# Patient Record
Sex: FEMALE | Race: BLACK OR AFRICAN AMERICAN | URBAN - METROPOLITAN AREA
[De-identification: names, ages, dates, MRNs, and addresses within clinical notes are randomized per-mention and may not be internally consistent; named-entity substitution may affect disease eponyms.]

---

## 2020-03-02 ENCOUNTER — NEW PATIENT (OUTPATIENT)
Dept: URBAN - METROPOLITAN AREA CLINIC 30 | Facility: CLINIC | Age: 60
End: 2020-03-02
Payer: COMMERCIAL

## 2020-03-02 PROCEDURE — 92134 CPTRZ OPH DX IMG PST SGM RTA: CPT | Performed by: OPTOMETRIST

## 2020-03-02 PROCEDURE — 92002 INTRM OPH EXAM NEW PATIENT: CPT | Performed by: OPTOMETRIST

## 2020-03-02 ASSESSMENT — KERATOMETRY
OD: 46.41
OS: 46.46

## 2020-03-02 ASSESSMENT — VISUAL ACUITY
OD: 20/20
OS: 20/25

## 2020-03-02 ASSESSMENT — INTRAOCULAR PRESSURE
OD: 16
OS: 16

## 2020-03-04 ENCOUNTER — CONSULT (OUTPATIENT)
Dept: URBAN - METROPOLITAN AREA CLINIC 30 | Facility: CLINIC | Age: 60
End: 2020-03-04
Payer: COMMERCIAL

## 2020-03-04 PROCEDURE — 76514 ECHO EXAM OF EYE THICKNESS: CPT | Performed by: OPHTHALMOLOGY

## 2020-03-04 PROCEDURE — 99204 OFFICE O/P NEW MOD 45 MIN: CPT | Performed by: OPHTHALMOLOGY

## 2020-03-04 PROCEDURE — 92133 CPTRZD OPH DX IMG PST SGM ON: CPT | Performed by: OPHTHALMOLOGY

## 2020-03-04 PROCEDURE — 92083 EXTENDED VISUAL FIELD XM: CPT | Performed by: OPHTHALMOLOGY

## 2020-03-04 PROCEDURE — 92020 GONIOSCOPY: CPT | Performed by: OPHTHALMOLOGY

## 2020-03-04 RX ORDER — PREDNISOLONE ACETATE 10 MG/ML
1 % SUSPENSION/ DROPS OPHTHALMIC
Qty: 1 | Refills: 1 | Status: INACTIVE
Start: 2020-03-04 | End: 2020-06-05

## 2020-03-04 ASSESSMENT — INTRAOCULAR PRESSURE
OS: 13
OD: 13

## 2020-04-16 ENCOUNTER — Encounter (OUTPATIENT)
Dept: URBAN - METROPOLITAN AREA CLINIC 24 | Facility: CLINIC | Age: 60
End: 2020-04-16
Payer: COMMERCIAL

## 2020-04-16 DIAGNOSIS — H40.033 ANATOMICAL NARROW ANGLE, BILATERAL: ICD-10-CM

## 2020-04-16 DIAGNOSIS — Z01.818 ENCOUNTER FOR OTHER PREPROCEDURAL EXAMINATION: Primary | ICD-10-CM

## 2020-04-16 PROCEDURE — 99213 OFFICE O/P EST LOW 20 MIN: CPT | Performed by: PHYSICIAN ASSISTANT

## 2020-04-17 ENCOUNTER — SURGERY (OUTPATIENT)
Dept: URBAN - METROPOLITAN AREA SURGERY 12 | Facility: SURGERY | Age: 60
End: 2020-04-17
Payer: COMMERCIAL

## 2020-04-17 PROCEDURE — 66761 REVISION OF IRIS: CPT | Performed by: OPHTHALMOLOGY

## 2020-05-08 ENCOUNTER — SURGERY (OUTPATIENT)
Dept: URBAN - METROPOLITAN AREA SURGERY 12 | Facility: SURGERY | Age: 60
End: 2020-05-08
Payer: COMMERCIAL

## 2020-05-08 PROCEDURE — 66761 REVISION OF IRIS: CPT | Performed by: OPHTHALMOLOGY

## 2020-06-05 ENCOUNTER — FOLLOW UP ESTABLISHED (OUTPATIENT)
Dept: URBAN - METROPOLITAN AREA CLINIC 24 | Facility: CLINIC | Age: 60
End: 2020-06-05
Payer: COMMERCIAL

## 2020-06-05 PROCEDURE — 92012 INTRM OPH EXAM EST PATIENT: CPT | Performed by: OPHTHALMOLOGY

## 2020-06-05 ASSESSMENT — INTRAOCULAR PRESSURE
OD: 16
OS: 14

## 2020-06-08 ENCOUNTER — FOLLOW UP ESTABLISHED (OUTPATIENT)
Dept: URBAN - METROPOLITAN AREA CLINIC 30 | Facility: CLINIC | Age: 60
End: 2020-06-08
Payer: COMMERCIAL

## 2020-06-08 DIAGNOSIS — H53.2 DIPLOPIA: ICD-10-CM

## 2020-06-08 DIAGNOSIS — H25.813 COMBINED FORMS OF AGE-RELATED CATARACT, BILATERAL: ICD-10-CM

## 2020-06-08 DIAGNOSIS — H35.363 DRUSEN (DEGENERATIVE) OF MACULA, BILATERAL: ICD-10-CM

## 2020-06-08 DIAGNOSIS — Z79.4 LONG TERM (CURRENT) USE OF INSULIN: ICD-10-CM

## 2020-06-08 PROCEDURE — 92134 CPTRZ OPH DX IMG PST SGM RTA: CPT | Performed by: OPTOMETRIST

## 2020-06-08 PROCEDURE — 92014 COMPRE OPH EXAM EST PT 1/>: CPT | Performed by: OPTOMETRIST

## 2020-06-08 ASSESSMENT — KERATOMETRY
OS: 46.62
OD: 46.60

## 2020-06-08 ASSESSMENT — INTRAOCULAR PRESSURE
OD: 14
OS: 14

## 2020-06-08 ASSESSMENT — VISUAL ACUITY
OS: 20/30
OD: 20/25

## 2020-06-23 ENCOUNTER — RX CHECK (OUTPATIENT)
Dept: URBAN - METROPOLITAN AREA CLINIC 30 | Facility: CLINIC | Age: 60
End: 2020-06-23
Payer: COMMERCIAL

## 2020-06-23 PROCEDURE — 99213 OFFICE O/P EST LOW 20 MIN: CPT | Performed by: OPTOMETRIST

## 2020-06-23 ASSESSMENT — VISUAL ACUITY
OD: 20/25
OS: 20/25

## 2021-09-24 ENCOUNTER — OFFICE VISIT (OUTPATIENT)
Dept: URBAN - METROPOLITAN AREA CLINIC 30 | Facility: CLINIC | Age: 61
End: 2021-09-24
Payer: COMMERCIAL

## 2021-09-24 DIAGNOSIS — H43.393 OTHER VITREOUS OPACITIES, BILATERAL: Primary | ICD-10-CM

## 2021-09-24 DIAGNOSIS — H57.11 OCULAR PAIN, RIGHT EYE: ICD-10-CM

## 2021-09-24 PROCEDURE — 92134 CPTRZ OPH DX IMG PST SGM RTA: CPT | Performed by: OPTOMETRIST

## 2021-09-24 PROCEDURE — 99214 OFFICE O/P EST MOD 30 MIN: CPT | Performed by: OPTOMETRIST

## 2021-09-24 RX ORDER — DICLOFENAC SODIUM 1 MG/ML
0.1 % SOLUTION/ DROPS OPHTHALMIC
Qty: 5 | Refills: 0 | Status: ACTIVE
Start: 2021-09-24

## 2021-09-24 ASSESSMENT — INTRAOCULAR PRESSURE
OD: 16
OS: 15

## 2021-09-24 NOTE — IMPRESSION/PLAN
Impression: Ocular pain, right eye: H57.11. Plan: Pt notes increased pain since onset of trauma x 2 wks. No evidence of iritis today. Pt notes some pain on eye movement as well. Will recommend ATs qid OU and Rx Diclofenac tid OD until next visit.

## 2021-09-24 NOTE — IMPRESSION/PLAN
Impression: Other vitreous opacities, bilateral: H43.393. Plan: Blunt trauma x 2 weeks. No evidence of iritis. Fundus exam appears stable. All signs and risks of retinal detachment or tears were discussed in detail. If pt notices any symptoms discussed or worsen, contact office ASAP. Recommend pt. return for normal recall. MAC OCT stable OU.

## 2021-10-22 ENCOUNTER — OFFICE VISIT (OUTPATIENT)
Dept: URBAN - METROPOLITAN AREA CLINIC 30 | Facility: CLINIC | Age: 61
End: 2021-10-22
Payer: COMMERCIAL

## 2021-10-22 DIAGNOSIS — E11.9 TYPE 2 DIABETES MELLITUS WITHOUT COMPLICATIONS: ICD-10-CM

## 2021-10-22 DIAGNOSIS — H43.811 VITREOUS DEGENERATION, RIGHT EYE: Primary | ICD-10-CM

## 2021-10-22 PROCEDURE — 92134 CPTRZ OPH DX IMG PST SGM RTA: CPT | Performed by: OPTOMETRIST

## 2021-10-22 PROCEDURE — 92014 COMPRE OPH EXAM EST PT 1/>: CPT | Performed by: OPTOMETRIST

## 2021-10-22 ASSESSMENT — KERATOMETRY
OD: 46.38
OS: 46.20

## 2021-10-22 ASSESSMENT — INTRAOCULAR PRESSURE
OD: 17
OS: 16

## 2021-10-22 ASSESSMENT — VISUAL ACUITY
OS: 20/20
OD: 20/20

## 2021-10-22 NOTE — IMPRESSION/PLAN
Impression: Diplopia ; OU
-EOMs full
-Pupils equal, round, reactive Plan: Denies any further episodes. New spec Rx today. ((Intermittent horizontal (noted vertical last visit 2 weeks ago) diplopia x 1-2 years, at near only. No pattern per pt in time of day. Had subdural hematoma July 2019 that was evacuated. CT 6/2020 Exophoria c L- hyper- at distance and near. Patient has hx of vertigo. 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  
NVO prism spec Rx generated for patient in office today))

## 2021-10-22 NOTE — IMPRESSION/PLAN
Impression: Vitreous degeneration, right eye: H43.811. Plan: Pt educ on findings. Retinas flat and attached OU. Reviewed signs and symptoms of RD and to call asap if occurs. MAC OCT today.

## 2021-10-22 NOTE — IMPRESSION/PLAN
Impression: Drusen (degenerative) of macula, bilateral Plan: Appears stable on exam and MAC OCT, OD>OS. Discussed as precursor to AMD- denies fam Hx. Patient educated. Rec antioxidant-rich diet and good UV protection.

## 2022-09-19 ENCOUNTER — HOSPITAL ENCOUNTER (INPATIENT)
Age: 62
LOS: 3 days | Discharge: HOME | DRG: 291 | End: 2022-09-22
Attending: EMERGENCY MEDICINE | Admitting: INTERNAL MEDICINE
Payer: MEDICARE

## 2022-09-19 ENCOUNTER — APPOINTMENT (OUTPATIENT)
Dept: GENERAL RADIOLOGY | Age: 62
DRG: 291 | End: 2022-09-19
Payer: MEDICARE

## 2022-09-19 DIAGNOSIS — I50.9 ACUTE CONGESTIVE HEART FAILURE, UNSPECIFIED HEART FAILURE TYPE (HCC): ICD-10-CM

## 2022-09-19 DIAGNOSIS — J44.1 COPD EXACERBATION (HCC): Primary | ICD-10-CM

## 2022-09-19 LAB
A/G RATIO: 1.3 (ref 1.1–2.2)
ALBUMIN SERPL-MCNC: 3.7 G/DL (ref 3.4–5)
ALP BLD-CCNC: 114 U/L (ref 40–129)
ALT SERPL-CCNC: 22 U/L (ref 10–40)
ANION GAP SERPL CALCULATED.3IONS-SCNC: 11 MMOL/L (ref 3–16)
AST SERPL-CCNC: 20 U/L (ref 15–37)
BASE EXCESS VENOUS: 5.5 MMOL/L (ref -2–3)
BASOPHILS ABSOLUTE: 0 K/UL (ref 0–0.2)
BASOPHILS RELATIVE PERCENT: 0.8 %
BILIRUB SERPL-MCNC: 0.6 MG/DL (ref 0–1)
BUN BLDV-MCNC: 10 MG/DL (ref 7–20)
CALCIUM SERPL-MCNC: 9.1 MG/DL (ref 8.3–10.6)
CARBOXYHEMOGLOBIN: 3.7 % (ref 0–1.5)
CHLORIDE BLD-SCNC: 99 MMOL/L (ref 99–110)
CO2: 30 MMOL/L (ref 21–32)
CREAT SERPL-MCNC: 0.5 MG/DL (ref 0.6–1.2)
EKG ATRIAL RATE: 92 BPM
EKG DIAGNOSIS: NORMAL
EKG P AXIS: 54 DEGREES
EKG P-R INTERVAL: 174 MS
EKG Q-T INTERVAL: 358 MS
EKG QRS DURATION: 84 MS
EKG QTC CALCULATION (BAZETT): 442 MS
EKG R AXIS: 67 DEGREES
EKG T AXIS: 83 DEGREES
EKG VENTRICULAR RATE: 92 BPM
EOSINOPHILS ABSOLUTE: 0 K/UL (ref 0–0.6)
EOSINOPHILS RELATIVE PERCENT: 0.8 %
GFR AFRICAN AMERICAN: >60
GFR NON-AFRICAN AMERICAN: >60
GLUCOSE BLD-MCNC: 161 MG/DL (ref 70–99)
HCO3 VENOUS: 33.3 MMOL/L (ref 24–28)
HCT VFR BLD CALC: 33.8 % (ref 36–48)
HEMOGLOBIN, VEN, REDUCED: 12.5 %
HEMOGLOBIN: 11 G/DL (ref 12–16)
LYMPHOCYTES ABSOLUTE: 1.1 K/UL (ref 1–5.1)
LYMPHOCYTES RELATIVE PERCENT: 20.9 %
MAGNESIUM: 1.6 MG/DL (ref 1.8–2.4)
MCH RBC QN AUTO: 25.8 PG (ref 26–34)
MCHC RBC AUTO-ENTMCNC: 32.6 G/DL (ref 31–36)
MCV RBC AUTO: 79.3 FL (ref 80–100)
METHEMOGLOBIN VENOUS: 0.3 % (ref 0–1.5)
MONOCYTES ABSOLUTE: 0.4 K/UL (ref 0–1.3)
MONOCYTES RELATIVE PERCENT: 7.5 %
NEUTROPHILS ABSOLUTE: 3.6 K/UL (ref 1.7–7.7)
NEUTROPHILS RELATIVE PERCENT: 70 %
O2 SAT, VEN: 87 %
PCO2, VEN: 66.9 MMHG (ref 41–51)
PDW BLD-RTO: 17.2 % (ref 12.4–15.4)
PH VENOUS: 7.3 (ref 7.35–7.45)
PLATELET # BLD: 246 K/UL (ref 135–450)
PMV BLD AUTO: 8.3 FL (ref 5–10.5)
PO2, VEN: 59.8 MMHG (ref 25–40)
POTASSIUM REFLEX MAGNESIUM: 3.5 MMOL/L (ref 3.5–5.1)
PRO-BNP: 1328 PG/ML (ref 0–124)
RBC # BLD: 4.27 M/UL (ref 4–5.2)
SODIUM BLD-SCNC: 140 MMOL/L (ref 136–145)
TCO2 CALC VENOUS: 35 MMOL/L
TOTAL PROTEIN: 6.5 G/DL (ref 6.4–8.2)
TROPONIN: <0.01 NG/ML
WBC # BLD: 5.1 K/UL (ref 4–11)

## 2022-09-19 PROCEDURE — 94640 AIRWAY INHALATION TREATMENT: CPT

## 2022-09-19 PROCEDURE — 6360000002 HC RX W HCPCS

## 2022-09-19 PROCEDURE — 85025 COMPLETE CBC W/AUTO DIFF WBC: CPT

## 2022-09-19 PROCEDURE — 82803 BLOOD GASES ANY COMBINATION: CPT

## 2022-09-19 PROCEDURE — 94664 DEMO&/EVAL PT USE INHALER: CPT

## 2022-09-19 PROCEDURE — 80053 COMPREHEN METABOLIC PANEL: CPT

## 2022-09-19 PROCEDURE — 93005 ELECTROCARDIOGRAM TRACING: CPT | Performed by: EMERGENCY MEDICINE

## 2022-09-19 PROCEDURE — 6370000000 HC RX 637 (ALT 250 FOR IP)

## 2022-09-19 PROCEDURE — 99285 EMERGENCY DEPT VISIT HI MDM: CPT

## 2022-09-19 PROCEDURE — 84484 ASSAY OF TROPONIN QUANT: CPT

## 2022-09-19 PROCEDURE — 2580000003 HC RX 258

## 2022-09-19 PROCEDURE — 71046 X-RAY EXAM CHEST 2 VIEWS: CPT

## 2022-09-19 PROCEDURE — 1200000000 HC SEMI PRIVATE

## 2022-09-19 PROCEDURE — 94761 N-INVAS EAR/PLS OXIMETRY MLT: CPT

## 2022-09-19 PROCEDURE — 2060000000 HC ICU INTERMEDIATE R&B

## 2022-09-19 PROCEDURE — 2700000000 HC OXYGEN THERAPY PER DAY

## 2022-09-19 PROCEDURE — 83880 ASSAY OF NATRIURETIC PEPTIDE: CPT

## 2022-09-19 PROCEDURE — 83735 ASSAY OF MAGNESIUM: CPT

## 2022-09-19 PROCEDURE — 6370000000 HC RX 637 (ALT 250 FOR IP): Performed by: EMERGENCY MEDICINE

## 2022-09-19 PROCEDURE — 36415 COLL VENOUS BLD VENIPUNCTURE: CPT

## 2022-09-19 PROCEDURE — 94660 CPAP INITIATION&MGMT: CPT

## 2022-09-19 PROCEDURE — 6360000002 HC RX W HCPCS: Performed by: EMERGENCY MEDICINE

## 2022-09-19 RX ORDER — PAROXETINE 10 MG/1
10 TABLET, FILM COATED ORAL EVERY MORNING
Status: DISCONTINUED | OUTPATIENT
Start: 2022-09-20 | End: 2022-09-19

## 2022-09-19 RX ORDER — POLYETHYLENE GLYCOL 3350 17 G/17G
17 POWDER, FOR SOLUTION ORAL DAILY PRN
Status: DISCONTINUED | OUTPATIENT
Start: 2022-09-19 | End: 2022-09-22 | Stop reason: HOSPADM

## 2022-09-19 RX ORDER — METOLAZONE 2.5 MG/1
2.5 TABLET ORAL DAILY
Status: DISCONTINUED | OUTPATIENT
Start: 2022-09-19 | End: 2022-09-19

## 2022-09-19 RX ORDER — DULAGLUTIDE 4.5 MG/.5ML
4.5 INJECTION, SOLUTION SUBCUTANEOUS WEEKLY
Status: ON HOLD | COMMUNITY
End: 2022-09-22 | Stop reason: SDUPTHER

## 2022-09-19 RX ORDER — ATORVASTATIN CALCIUM 20 MG/1
20 TABLET, FILM COATED ORAL DAILY
Status: DISCONTINUED | OUTPATIENT
Start: 2022-09-20 | End: 2022-09-22 | Stop reason: HOSPADM

## 2022-09-19 RX ORDER — ONDANSETRON 2 MG/ML
4 INJECTION INTRAMUSCULAR; INTRAVENOUS EVERY 6 HOURS PRN
Status: DISCONTINUED | OUTPATIENT
Start: 2022-09-19 | End: 2022-09-22 | Stop reason: HOSPADM

## 2022-09-19 RX ORDER — FUROSEMIDE 10 MG/ML
40 INJECTION INTRAMUSCULAR; INTRAVENOUS ONCE
Status: COMPLETED | OUTPATIENT
Start: 2022-09-19 | End: 2022-09-19

## 2022-09-19 RX ORDER — SODIUM CHLORIDE 9 MG/ML
INJECTION, SOLUTION INTRAVENOUS PRN
Status: DISCONTINUED | OUTPATIENT
Start: 2022-09-19 | End: 2022-09-22 | Stop reason: HOSPADM

## 2022-09-19 RX ORDER — ONDANSETRON 4 MG/1
4 TABLET, ORALLY DISINTEGRATING ORAL EVERY 8 HOURS PRN
Status: DISCONTINUED | OUTPATIENT
Start: 2022-09-19 | End: 2022-09-22 | Stop reason: HOSPADM

## 2022-09-19 RX ORDER — PREGABALIN 75 MG/1
75 CAPSULE ORAL DAILY
Status: DISCONTINUED | OUTPATIENT
Start: 2022-09-19 | End: 2022-09-19

## 2022-09-19 RX ORDER — FUROSEMIDE 10 MG/ML
40 INJECTION INTRAMUSCULAR; INTRAVENOUS 2 TIMES DAILY
Status: DISCONTINUED | OUTPATIENT
Start: 2022-09-19 | End: 2022-09-20

## 2022-09-19 RX ORDER — BUMETANIDE 1 MG/1
1 TABLET ORAL DAILY
Status: ON HOLD | COMMUNITY
End: 2022-09-22 | Stop reason: HOSPADM

## 2022-09-19 RX ORDER — SODIUM CHLORIDE 0.9 % (FLUSH) 0.9 %
5-40 SYRINGE (ML) INJECTION EVERY 12 HOURS SCHEDULED
Status: DISCONTINUED | OUTPATIENT
Start: 2022-09-19 | End: 2022-09-22 | Stop reason: HOSPADM

## 2022-09-19 RX ORDER — IPRATROPIUM BROMIDE AND ALBUTEROL SULFATE 2.5; .5 MG/3ML; MG/3ML
1 SOLUTION RESPIRATORY (INHALATION) ONCE
Status: COMPLETED | OUTPATIENT
Start: 2022-09-19 | End: 2022-09-19

## 2022-09-19 RX ORDER — BUMETANIDE 1 MG/1
2 TABLET ORAL
Status: ON HOLD | COMMUNITY
End: 2022-09-22 | Stop reason: HOSPADM

## 2022-09-19 RX ORDER — ATORVASTATIN CALCIUM 20 MG/1
20 TABLET, FILM COATED ORAL DAILY
Status: DISCONTINUED | OUTPATIENT
Start: 2022-09-19 | End: 2022-09-19

## 2022-09-19 RX ORDER — BUDESONIDE 0.5 MG/2ML
0.5 INHALANT ORAL 2 TIMES DAILY
Status: DISCONTINUED | OUTPATIENT
Start: 2022-09-19 | End: 2022-09-22 | Stop reason: HOSPADM

## 2022-09-19 RX ORDER — ACETAMINOPHEN 325 MG/1
650 TABLET ORAL EVERY 6 HOURS PRN
Status: DISCONTINUED | OUTPATIENT
Start: 2022-09-19 | End: 2022-09-21

## 2022-09-19 RX ORDER — POTASSIUM CHLORIDE 20 MEQ/1
40 TABLET, EXTENDED RELEASE ORAL ONCE
Status: COMPLETED | OUTPATIENT
Start: 2022-09-19 | End: 2022-09-19

## 2022-09-19 RX ORDER — ACETAMINOPHEN 650 MG/1
650 SUPPOSITORY RECTAL EVERY 6 HOURS PRN
Status: DISCONTINUED | OUTPATIENT
Start: 2022-09-19 | End: 2022-09-21

## 2022-09-19 RX ORDER — METHYLPREDNISOLONE SODIUM SUCCINATE 125 MG/2ML
60 INJECTION, POWDER, LYOPHILIZED, FOR SOLUTION INTRAMUSCULAR; INTRAVENOUS ONCE
Status: COMPLETED | OUTPATIENT
Start: 2022-09-19 | End: 2022-09-19

## 2022-09-19 RX ORDER — SODIUM CHLORIDE 0.9 % (FLUSH) 0.9 %
5-40 SYRINGE (ML) INJECTION PRN
Status: DISCONTINUED | OUTPATIENT
Start: 2022-09-19 | End: 2022-09-22 | Stop reason: HOSPADM

## 2022-09-19 RX ORDER — MAGNESIUM SULFATE IN WATER 40 MG/ML
2000 INJECTION, SOLUTION INTRAVENOUS ONCE
Status: COMPLETED | OUTPATIENT
Start: 2022-09-19 | End: 2022-09-19

## 2022-09-19 RX ORDER — ATORVASTATIN CALCIUM 20 MG/1
20 TABLET, FILM COATED ORAL DAILY
COMMUNITY

## 2022-09-19 RX ORDER — BUDESONIDE AND FORMOTEROL FUMARATE DIHYDRATE 160; 4.5 UG/1; UG/1
2 AEROSOL RESPIRATORY (INHALATION) DAILY
COMMUNITY

## 2022-09-19 RX ORDER — PANTOPRAZOLE SODIUM 40 MG/1
40 TABLET, DELAYED RELEASE ORAL
Status: DISCONTINUED | OUTPATIENT
Start: 2022-09-20 | End: 2022-09-22 | Stop reason: HOSPADM

## 2022-09-19 RX ORDER — TRAMADOL HYDROCHLORIDE 50 MG/1
50 TABLET ORAL EVERY 6 HOURS PRN
Status: DISCONTINUED | OUTPATIENT
Start: 2022-09-19 | End: 2022-09-19

## 2022-09-19 RX ADMIN — FUROSEMIDE 40 MG: 10 INJECTION, SOLUTION INTRAMUSCULAR; INTRAVENOUS at 22:33

## 2022-09-19 RX ADMIN — BUDESONIDE 500 MCG: 0.5 SUSPENSION RESPIRATORY (INHALATION) at 22:30

## 2022-09-19 RX ADMIN — FUROSEMIDE 40 MG: 10 INJECTION, SOLUTION INTRAMUSCULAR; INTRAVENOUS at 15:33

## 2022-09-19 RX ADMIN — MAGNESIUM SULFATE HEPTAHYDRATE 2000 MG: 40 INJECTION, SOLUTION INTRAVENOUS at 15:43

## 2022-09-19 RX ADMIN — IPRATROPIUM BROMIDE AND ALBUTEROL SULFATE 1 AMPULE: .5; 3 SOLUTION RESPIRATORY (INHALATION) at 15:39

## 2022-09-19 RX ADMIN — METHYLPREDNISOLONE SODIUM SUCCINATE 60 MG: 125 INJECTION, POWDER, FOR SOLUTION INTRAMUSCULAR; INTRAVENOUS at 15:35

## 2022-09-19 RX ADMIN — SODIUM CHLORIDE, PRESERVATIVE FREE 10 ML: 5 INJECTION INTRAVENOUS at 22:33

## 2022-09-19 RX ADMIN — POTASSIUM CHLORIDE 40 MEQ: 1500 TABLET, EXTENDED RELEASE ORAL at 17:44

## 2022-09-19 ASSESSMENT — ENCOUNTER SYMPTOMS
DIARRHEA: 0
ABDOMINAL PAIN: 0
WHEEZING: 1
COUGH: 1
EYES NEGATIVE: 1
CHEST TIGHTNESS: 0
CONSTIPATION: 0
SHORTNESS OF BREATH: 1
VOMITING: 0
NAUSEA: 0

## 2022-09-19 ASSESSMENT — LIFESTYLE VARIABLES: HOW OFTEN DO YOU HAVE A DRINK CONTAINING ALCOHOL: NEVER

## 2022-09-19 ASSESSMENT — PAIN - FUNCTIONAL ASSESSMENT: PAIN_FUNCTIONAL_ASSESSMENT: NONE - DENIES PAIN

## 2022-09-19 ASSESSMENT — PAIN SCALES - GENERAL: PAINLEVEL_OUTOF10: 0

## 2022-09-19 NOTE — H&P
mellitus ([facility]     diet controlled    Hypertension     Obesity     Pulmonary embolism ([facility] 2013    Sleep apnea     bipap    Subdural hematoma ([facility] 2019       Past Surgical History:        Procedure Laterality Date    APPENDECTOMY       SECTION      HERNIA MESH REMOVAL      HIATAL HERNIA REPAIR  2022    HYSTERECTOMY (CERVIX STATUS UNKNOWN)      LAPAROSCOPY         Medications Prior to Admission:    Not in a hospital admission. Allergies:  Patient has no known allergies. Social History:   TOBACCO: reports that she has been smoking cigarettes. She has been smoking an average of .5 packs per day. She does not have any smokeless tobacco history on file. 20Pack/years cigarette smoking  ETOH:   reports no history of alcohol use. DRUGS : None  Patient currently lives alone    Family History:   No family history on file. Review of Systems   Constitutional:  Positive for activity change and fatigue. Negative for chills, diaphoresis and fever. Respiratory:  Positive for cough, shortness of breath and wheezing. Negative for chest tightness. Cardiovascular:  Positive for leg swelling. Negative for chest pain and palpitations. Gastrointestinal:  Negative for constipation, diarrhea, nausea and vomiting. Neurological:  Positive for headaches. Negative for dizziness and weakness. : A 10 point review of systems was conducted, significant findings as noted in HPI. Physical Exam  Constitutional:       General: She is awake. Appearance: She is morbidly obese. She is ill-appearing. Eyes:      Extraocular Movements: Extraocular movements intact. Neck:      Vascular: No JVD. Cardiovascular:      Rate and Rhythm: Normal rate and regular rhythm. Pulses:           Radial pulses are 2+ on the right side and 2+ on the left side. Heart sounds: Normal heart sounds, S1 normal and S2 normal. No murmur heard.   Pulmonary:      Effort: Pulmonary effort is normal.      Breath sounds: Normal air entry. Wheezing and rhonchi present. Abdominal:      General: Abdomen is protuberant. Bowel sounds are normal.      Palpations: Abdomen is soft. Tenderness: There is no abdominal tenderness. Musculoskeletal:      Right lower le+ Pitting Edema present. Left lower le+ Pitting Edema present. Neurological:      Mental Status: She is alert and oriented to person, place, and time. Psychiatric:         Behavior: Behavior is cooperative. Vitals:    22 1309   BP:    Pulse:    Resp:    Temp:    SpO2: 90%       DATA:    Labs:  BMP:   Recent Labs     22  1226      K 3.5   CL 99   CO2 30   BUN 10   CREATININE 0.5*   GLUCOSE 161*     CBC:   Recent Labs     22  1226   WBC 5.1   HGB 11.0*   HCT 33.8*          LFT's:   Recent Labs     22  1226   AST 20   ALT 22   BILITOT 0.6   ALKPHOS 114     Troponin:   Recent Labs     22  1226   TROPONINI <0.01     BNP: No results for input(s): BNP in the last 72 hours. ABGs: No results for input(s): PHART, IJU4ZPU, PO2ART in the last 72 hours. INR: No results for input(s): INR in the last 72 hours. U/A:No results for input(s): NITRITE, COLORU, PHUR, LABCAST, WBCUA, RBCUA, MUCUS, TRICHOMONAS, YEAST, BACTERIA, CLARITYU, SPECGRAV, LEUKOCYTESUR, UROBILINOGEN, BILIRUBINUR, BLOODU, GLUCOSEU, AMORPHOUS in the last 72 hours. Invalid input(s): KETONESU    XR CHEST (2 VW)   Final Result   Impression: Stable cardiomegaly. Mild pulmonary vascular congestion. ASSESSMENT AND PLAN:  Osman Coley is a 58 y.o. female with a past medical history of type 2 diabetes, hypertension, obesity, MARIS versus OHS, previous PE on apixaban, diastolic congestive heart failure with an ejection fraction of 55 to 60% on echocardiogram from , and COPD who presents with increasing shortness of breath and fatigue on exertion over the last several days.     COPD Exacerbation: patient states that she has had increased sputum production and increased shortness of breath over the last several days. Increased sputum expectoration since symptom onset. VBG notable for a CO2 of 66. Albuterol  Duoneb treatments  Azithromycin  Prednisone 40  Diastolic HF Exacerbation: Patient states that she has been having orthopnea and PND at home. Increasing bilateral lower extremity edema. Pro BNP ~1400. Lasix 40 IV QD  Strict Is and Os  Weight measurements  Currently requiring 2L via nasal canula  Echocardiogram while inpatient as most recent was in 2015  Pharmacy consult for medication reconciliation  DM2-   Will have the patient on a sliding scale  Add basal insulin as needed  Have patient on a low-carb diet while inpatient  MARIS vs OHS  Continue at home CPAP  Hypertension  Hydralazine 10 PRN to maintain systolic BP below 617  Electrolyte abnormalities  Will monitor daily with BMP, Mag  Will replete as needed    Code Status: No Order  FEN: IVF: none;  No diet orders on file  PPX: Protonix 40 BID  DISPO: ODILIA Encarnacion MD  PGY1, Internal Medicine  9/19/2022,  3:46 PM    This patient will be discussed with attending, Meek Morris MD.

## 2022-09-19 NOTE — ED PROVIDER NOTES
[address] ENCOUNTER          ATTENDING PHYSICIAN NOTE       Date of evaluation: 2022    Chief Complaint     Shortness of Breath (Pt states that SOB started yesterday morning and this morning couldn't keep sats above 80)      History of Present Illness     Azeb Sevilla is a 58 y.o. female with a history of diabetes, hypertension, obesity, PE on apixaban, and COPD with sleep apnea, who presents with complaints of increasing shortness of breath over the last several days. The patient notes that it it is mostly with exertion that she gets winded. She does have inhalers at home for her COPD but does not report an increase in usage of them. She states that she is unable to lay flat but is not because she gets short of breath. She does report a cough occasionally productive of sputum without fever. She denies nausea, vomiting, or diarrhea. She does note some leg swelling. Review of Systems     Review of Systems   Constitutional:  Negative for chills and fever. HENT: Negative. Eyes: Negative. Respiratory:  Positive for cough and shortness of breath. Negative for chest tightness. Cardiovascular:  Positive for leg swelling. Negative for chest pain and palpitations. Gastrointestinal:  Negative for abdominal pain, diarrhea, nausea and vomiting. Musculoskeletal: Negative. Skin: Negative. Neurological: Negative. Psychiatric/Behavioral: Negative. All other systems reviewed and are negative. Past Medical, Surgical, Family, and Social History     She has a past medical history of COPD (chronic obstructive pulmonary disease) ([address] Diabetes mellitus ([address] Hypertension, Obesity, Pulmonary embolism ([address] Sleep apnea, and Subdural hematoma ([address] She has a past surgical history that includes hiatal hernia repair (2022);  section; Appendectomy; laparoscopy; Hysterectomy; and Hernia mesh removal.  Her family history is not on file.   She reports that she has been smoking cigarettes. She has been smoking an average of .5 packs per day. She does not have any smokeless tobacco history on file. She reports that she does not drink alcohol and does not use drugs. Medications     Previous Medications    ALBUTEROL (PROVENTIL HFA;VENTOLIN HFA) 108 (90 BASE) MCG/ACT INHALER    Inhale 2 puffs into the lungs every 6 hours as needed for Wheezing. APIXABAN (ELIQUIS) 5 MG TABS TABLET    Take 5 mg by mouth 2 times daily    ATORVASTATIN (LIPITOR) 20 MG TABLET    Take 20 mg by mouth daily    BUMETANIDE (BUMEX) 1 MG TABLET    Take 2 mg by mouth 2 times daily    DULAGLUTIDE (TRULICITY) 4.5 KU/8.1TN SOPN    Inject 4.5 mg into the skin once a week    FUROSEMIDE (LASIX) 40 MG TABLET    2 tablets in am and 2 tablets in pm    GLIMEPIRIDE (AMARYL) 4 MG TABLET    Take 4 mg by mouth every morning (before breakfast)    METFORMIN (GLUCOPHAGE) 500 MG TABLET    Take 1,000 mg by mouth 2 times daily Pt states 1000 mg in the AM and 500 mg at bedtime. METOLAZONE (ZAROXOLYN) 2.5 MG TABLET    Take 2.5 mg by mouth    NITROGLYCERIN (NITROSTAT) 0.4 MG SL TABLET    Place 0.4 mg under the tongue every 5 minutes as needed for Chest pain    PAROXETINE (PAXIL) 10 MG TABLET    Take 10 mg by mouth every morning. PREGABALIN (LYRICA) 75 MG CAPSULE    Take 75 mg by mouth    TRAMADOL (ULTRAM) 50 MG TABLET    Take 1 tablet by mouth every 6 hours as needed for Pain    WARFARIN (COUMADIN) 10 MG TABLET    Take 20 mg by mouth Indications: tues, wed, thurs, sat, and sun    WARFARIN (COUMADIN) 10 MG TABLET    Take 15 mg by mouth daily Indications: monday and friday       Allergies     She has No Known Allergies. Physical Exam     INITIAL VITALS: BP: (!) 170/75, Temp: 98.2 °F (36.8 °C), Heart Rate: 95, Resp: 22, SpO2: (!) 88 %   Physical Exam  Vitals and nursing note reviewed. Constitutional:       General: She is not in acute distress. Appearance: She is well-developed.  She is not diaphoretic. Cardiovascular:      Rate and Rhythm: Normal rate and regular rhythm. Pulmonary:      Effort: Pulmonary effort is normal. No respiratory distress. Breath sounds: Decreased breath sounds present. Abdominal:      General: Bowel sounds are normal. There is no distension. Palpations: Abdomen is soft. Tenderness: There is no abdominal tenderness. Musculoskeletal:      Right lower leg: Edema present. Left lower leg: Edema present. Skin:     General: Skin is warm and dry. Neurological:      Mental Status: She is alert and oriented to person, place, and time. Psychiatric:         Behavior: Behavior normal.       Diagnostic Results     EKG   Interpreted by Patrick Tam MD     Rhythm: normal sinus   Rate: normal  Axis: normal  Ectopy:   Conduction: normal  ST Segments: no acute change  T Waves: nonspecific  Q Waves: none    Clinical Impression: normal sinus rhythm with PACs and nonspecific T wave changes      RADIOLOGY:  XR CHEST (2 VW)   Final Result   Impression: Stable cardiomegaly. Mild pulmonary vascular congestion.           LABS:   Results for orders placed or performed during the hospital encounter of 09/19/22   Brain Natriuretic Peptide   Result Value Ref Range    Pro-BNP 1,328 (H) 0 - 124 pg/mL   CBC with Auto Differential   Result Value Ref Range    WBC 5.1 4.0 - 11.0 K/uL    RBC 4.27 4.00 - 5.20 M/uL    Hemoglobin 11.0 (L) 12.0 - 16.0 g/dL    Hematocrit 33.8 (L) 36.0 - 48.0 %    MCV 79.3 (L) 80.0 - 100.0 fL    MCH 25.8 (L) 26.0 - 34.0 pg    MCHC 32.6 31.0 - 36.0 g/dL    RDW 17.2 (H) 12.4 - 15.4 %    Platelets 361 047 - 998 K/uL    MPV 8.3 5.0 - 10.5 fL    Neutrophils % 70.0 %    Lymphocytes % 20.9 %    Monocytes % 7.5 %    Eosinophils % 0.8 %    Basophils % 0.8 %    Neutrophils Absolute 3.6 1.7 - 7.7 K/uL    Lymphocytes Absolute 1.1 1.0 - 5.1 K/uL    Monocytes Absolute 0.4 0.0 - 1.3 K/uL    Eosinophils Absolute 0.0 0.0 - 0.6 K/uL    Basophils Absolute 0.0 0.0 - 0.2 K/uL   CMP w/ Reflex to MG   Result Value Ref Range    Sodium 140 136 - 145 mmol/L    Potassium reflex Magnesium 3.5 3.5 - 5.1 mmol/L    Chloride 99 99 - 110 mmol/L    CO2 30 21 - 32 mmol/L    Anion Gap 11 3 - 16    Glucose 161 (H) 70 - 99 mg/dL    BUN 10 7 - 20 mg/dL    Creatinine 0.5 (L) 0.6 - 1.2 mg/dL    GFR Non-African American >60 >60    GFR African American >60 >60    Calcium 9.1 8.3 - 10.6 mg/dL    Total Protein 6.5 6.4 - 8.2 g/dL    Albumin 3.7 3.4 - 5.0 g/dL    Albumin/Globulin Ratio 1.3 1.1 - 2.2    Total Bilirubin 0.6 0.0 - 1.0 mg/dL    Alkaline Phosphatase 114 40 - 129 U/L    ALT 22 10 - 40 U/L    AST 20 15 - 37 U/L   Troponin   Result Value Ref Range    Troponin <0.01 <0.01 ng/mL   Magnesium   Result Value Ref Range    Magnesium 1.60 (L) 1.80 - 2.40 mg/dL   EKG 12 Lead   Result Value Ref Range    Ventricular Rate 92 BPM    Atrial Rate 92 BPM    P-R Interval 174 ms    QRS Duration 84 ms    Q-T Interval 358 ms    QTc Calculation (Bazett) 442 ms    P Axis 54 degrees    R Axis 67 degrees    T Axis 83 degrees    Diagnosis       EKG performed in ER and to be interpreted by ER physician. Confirmed by MD, ER ([id number]),  Myriam Robles ([id number]) on 9/19/2022 1:43:33 PM       ED BEDSIDE ULTRASOUND:  No results found. RECENT VITALS:  BP: (!) 126/91,Temp: 98.2 °F (36.8 °C), Heart Rate: 91, Resp: 30, SpO2: 90 %     ED Course     Nursing Notes, Past Medical Hx, Past Surgical Hx, Social Hx,Allergies, and Family Hx were reviewed. patient was given the following medications:  No orders of the defined types were placed in this encounter. CONSULTS:  Pete Munoz HOSPITALIST    MEDICAL DECISIONMAKING / ASSESSMENT / Ciarra Daisha is a 58 y.o. female with a history of morbid obesity, COPD, and obstructive sleep apnea here with increasing shortness of breath with exertion found to be hypoxic with oxygen saturations in the upper 80% range.   She is not normally on oxygen at home except through her CPAP machine at night on occasion. Given her hypoxia, a work-up to look into COPD versus CHF was obtained and she was noted to have some pulmonary vascular congestion on chest x-ray as well as wheezing on exam consistent with COPD. It appears that she has a mixed etiology of her shortness of breath today possibly with some new onset congestive heart failure. The patient is also noncompliant with her medications and has not been taking many of her medications including Lasix for lymphedema. Of note I can only find an echocardiogram from 2015 which showed an ejection fraction of 55 to 60% with mild grade 1 diastolic dysfunction. This may need to be repeated and the patient's medications likely will need to be optimized while she is hospitalized and until her oxygen requirement dissipates. Clinical Impression     1. COPD exacerbation ([facility]    2.  Acute congestive heart failure, unspecified heart failure type [facility])        Disposition     DISPOSITION Decision To Admit 09/19/2022 02:59:14 PM         Alvarez Owen MD  09/19/22 [id number]       Alvarez Owen MD  09/19/22 [address], [place]  09/19/22 [id number]

## 2022-09-20 PROBLEM — E66.01 MORBID OBESITY (HCC): Status: ACTIVE | Noted: 2022-09-20

## 2022-09-20 PROBLEM — I50.31 ACUTE DIASTOLIC HEART FAILURE (HCC): Status: ACTIVE | Noted: 2022-09-20

## 2022-09-20 PROBLEM — J96.22 ACUTE ON CHRONIC RESPIRATORY FAILURE WITH HYPERCAPNIA (HCC): Status: ACTIVE | Noted: 2022-09-20

## 2022-09-20 PROBLEM — G47.30 SLEEP APNEA: Status: ACTIVE | Noted: 2022-09-20

## 2022-09-20 LAB
ALBUMIN SERPL-MCNC: 4 G/DL (ref 3.4–5)
ANION GAP SERPL CALCULATED.3IONS-SCNC: 10 MMOL/L (ref 3–16)
ANION GAP SERPL CALCULATED.3IONS-SCNC: 11 MMOL/L (ref 3–16)
BASOPHILS ABSOLUTE: 0 K/UL (ref 0–0.2)
BASOPHILS RELATIVE PERCENT: 0.2 %
BUN BLDV-MCNC: 12 MG/DL (ref 7–20)
BUN BLDV-MCNC: 9 MG/DL (ref 7–20)
CALCIUM SERPL-MCNC: 8.8 MG/DL (ref 8.3–10.6)
CALCIUM SERPL-MCNC: 8.9 MG/DL (ref 8.3–10.6)
CHLORIDE BLD-SCNC: 93 MMOL/L (ref 99–110)
CHLORIDE BLD-SCNC: 99 MMOL/L (ref 99–110)
CO2: 34 MMOL/L (ref 21–32)
CO2: 35 MMOL/L (ref 21–32)
CREAT SERPL-MCNC: 0.5 MG/DL (ref 0.6–1.2)
CREAT SERPL-MCNC: 0.7 MG/DL (ref 0.6–1.2)
EOSINOPHILS ABSOLUTE: 0 K/UL (ref 0–0.6)
EOSINOPHILS RELATIVE PERCENT: 0 %
GFR AFRICAN AMERICAN: >60
GFR AFRICAN AMERICAN: >60
GFR NON-AFRICAN AMERICAN: >60
GFR NON-AFRICAN AMERICAN: >60
GLUCOSE BLD-MCNC: 129 MG/DL (ref 70–99)
GLUCOSE BLD-MCNC: 180 MG/DL (ref 70–99)
HCT VFR BLD CALC: 35.3 % (ref 36–48)
HEMOGLOBIN: 11.2 G/DL (ref 12–16)
LYMPHOCYTES ABSOLUTE: 0.5 K/UL (ref 1–5.1)
LYMPHOCYTES RELATIVE PERCENT: 11 %
MAGNESIUM: 1.9 MG/DL (ref 1.8–2.4)
MCH RBC QN AUTO: 25.8 PG (ref 26–34)
MCHC RBC AUTO-ENTMCNC: 31.6 G/DL (ref 31–36)
MCV RBC AUTO: 81.7 FL (ref 80–100)
MONOCYTES ABSOLUTE: 0.2 K/UL (ref 0–1.3)
MONOCYTES RELATIVE PERCENT: 4.4 %
NEUTROPHILS ABSOLUTE: 4.2 K/UL (ref 1.7–7.7)
NEUTROPHILS RELATIVE PERCENT: 84.4 %
PDW BLD-RTO: 17.8 % (ref 12.4–15.4)
PHOSPHORUS: 2.8 MG/DL (ref 2.5–4.9)
PLATELET # BLD: 253 K/UL (ref 135–450)
PMV BLD AUTO: 8.4 FL (ref 5–10.5)
POTASSIUM REFLEX MAGNESIUM: 4.4 MMOL/L (ref 3.5–5.1)
POTASSIUM SERPL-SCNC: 4.2 MMOL/L (ref 3.5–5.1)
PRO-BNP: 727 PG/ML (ref 0–124)
RBC # BLD: 4.32 M/UL (ref 4–5.2)
SODIUM BLD-SCNC: 139 MMOL/L (ref 136–145)
SODIUM BLD-SCNC: 143 MMOL/L (ref 136–145)
WBC # BLD: 5 K/UL (ref 4–11)

## 2022-09-20 PROCEDURE — 80069 RENAL FUNCTION PANEL: CPT

## 2022-09-20 PROCEDURE — 6370000000 HC RX 637 (ALT 250 FOR IP)

## 2022-09-20 PROCEDURE — 6360000002 HC RX W HCPCS

## 2022-09-20 PROCEDURE — 97530 THERAPEUTIC ACTIVITIES: CPT

## 2022-09-20 PROCEDURE — 83880 ASSAY OF NATRIURETIC PEPTIDE: CPT

## 2022-09-20 PROCEDURE — 97166 OT EVAL MOD COMPLEX 45 MIN: CPT

## 2022-09-20 PROCEDURE — 2060000000 HC ICU INTERMEDIATE R&B

## 2022-09-20 PROCEDURE — 94640 AIRWAY INHALATION TREATMENT: CPT

## 2022-09-20 PROCEDURE — 94660 CPAP INITIATION&MGMT: CPT

## 2022-09-20 PROCEDURE — 2580000003 HC RX 258

## 2022-09-20 PROCEDURE — 85025 COMPLETE CBC W/AUTO DIFF WBC: CPT

## 2022-09-20 PROCEDURE — 2700000000 HC OXYGEN THERAPY PER DAY

## 2022-09-20 PROCEDURE — 83735 ASSAY OF MAGNESIUM: CPT

## 2022-09-20 PROCEDURE — 97162 PT EVAL MOD COMPLEX 30 MIN: CPT

## 2022-09-20 PROCEDURE — 97116 GAIT TRAINING THERAPY: CPT

## 2022-09-20 PROCEDURE — 97535 SELF CARE MNGMENT TRAINING: CPT

## 2022-09-20 PROCEDURE — 94761 N-INVAS EAR/PLS OXIMETRY MLT: CPT

## 2022-09-20 PROCEDURE — 36415 COLL VENOUS BLD VENIPUNCTURE: CPT

## 2022-09-20 RX ORDER — PREDNISONE 20 MG/1
40 TABLET ORAL DAILY
Status: DISCONTINUED | OUTPATIENT
Start: 2022-09-20 | End: 2022-09-22 | Stop reason: HOSPADM

## 2022-09-20 RX ORDER — MAGNESIUM SULFATE 1 G/100ML
1000 INJECTION INTRAVENOUS ONCE
Status: COMPLETED | OUTPATIENT
Start: 2022-09-20 | End: 2022-09-20

## 2022-09-20 RX ORDER — PANTOPRAZOLE SODIUM 40 MG/1
TABLET, DELAYED RELEASE ORAL
Status: COMPLETED
Start: 2022-09-20 | End: 2022-09-20

## 2022-09-20 RX ORDER — SENNA PLUS 8.6 MG/1
1 TABLET ORAL NIGHTLY
Status: DISCONTINUED | OUTPATIENT
Start: 2022-09-20 | End: 2022-09-22 | Stop reason: HOSPADM

## 2022-09-20 RX ADMIN — BUDESONIDE 500 MCG: 0.5 SUSPENSION RESPIRATORY (INHALATION) at 08:17

## 2022-09-20 RX ADMIN — PANTOPRAZOLE SODIUM 40 MG: 40 TABLET, DELAYED RELEASE ORAL at 04:48

## 2022-09-20 RX ADMIN — PREDNISONE 40 MG: 20 TABLET ORAL at 08:57

## 2022-09-20 RX ADMIN — APIXABAN 5 MG: 5 TABLET, FILM COATED ORAL at 08:58

## 2022-09-20 RX ADMIN — SODIUM CHLORIDE, PRESERVATIVE FREE 10 ML: 5 INJECTION INTRAVENOUS at 20:53

## 2022-09-20 RX ADMIN — FUROSEMIDE 40 MG: 10 INJECTION, SOLUTION INTRAMUSCULAR; INTRAVENOUS at 08:57

## 2022-09-20 RX ADMIN — ACETAMINOPHEN 650 MG: 325 TABLET, FILM COATED ORAL at 20:51

## 2022-09-20 RX ADMIN — MAGNESIUM SULFATE HEPTAHYDRATE 1000 MG: 1 INJECTION, SOLUTION INTRAVENOUS at 08:51

## 2022-09-20 RX ADMIN — SODIUM CHLORIDE 25 ML: 9 INJECTION, SOLUTION INTRAVENOUS at 08:51

## 2022-09-20 RX ADMIN — APIXABAN 5 MG: 5 TABLET, FILM COATED ORAL at 20:51

## 2022-09-20 RX ADMIN — ACETAMINOPHEN 650 MG: 325 TABLET, FILM COATED ORAL at 04:48

## 2022-09-20 RX ADMIN — BUDESONIDE 500 MCG: 0.5 SUSPENSION RESPIRATORY (INHALATION) at 21:05

## 2022-09-20 RX ADMIN — ATORVASTATIN CALCIUM 20 MG: 20 TABLET, FILM COATED ORAL at 08:58

## 2022-09-20 RX ADMIN — FUROSEMIDE 5 MG/HR: 10 INJECTION INTRAMUSCULAR; INTRAVENOUS at 16:22

## 2022-09-20 RX ADMIN — APIXABAN 5 MG: 5 TABLET, FILM COATED ORAL at 01:49

## 2022-09-20 RX ADMIN — SODIUM CHLORIDE, PRESERVATIVE FREE 10 ML: 5 INJECTION INTRAVENOUS at 08:58

## 2022-09-20 ASSESSMENT — PAIN SCALES - GENERAL
PAINLEVEL_OUTOF10: 3
PAINLEVEL_OUTOF10: 0
PAINLEVEL_OUTOF10: 2
PAINLEVEL_OUTOF10: 0
PAINLEVEL_OUTOF10: 3

## 2022-09-20 ASSESSMENT — PAIN DESCRIPTION - ONSET
ONSET: AWAKENED FROM SLEEP
ONSET: PROGRESSIVE

## 2022-09-20 ASSESSMENT — PAIN DESCRIPTION - DESCRIPTORS
DESCRIPTORS: ACHING
DESCRIPTORS: ACHING

## 2022-09-20 ASSESSMENT — PAIN DESCRIPTION - LOCATION
LOCATION: HEAD
LOCATION: HEAD

## 2022-09-20 ASSESSMENT — PAIN DESCRIPTION - ORIENTATION
ORIENTATION: MID
ORIENTATION: MID

## 2022-09-20 ASSESSMENT — PAIN DESCRIPTION - FREQUENCY
FREQUENCY: INTERMITTENT
FREQUENCY: INTERMITTENT

## 2022-09-20 ASSESSMENT — PAIN - FUNCTIONAL ASSESSMENT
PAIN_FUNCTIONAL_ASSESSMENT: PREVENTS OR INTERFERES SOME ACTIVE ACTIVITIES AND ADLS
PAIN_FUNCTIONAL_ASSESSMENT: PREVENTS OR INTERFERES SOME ACTIVE ACTIVITIES AND ADLS

## 2022-09-20 NOTE — DISCHARGE SUMMARY
INTERNAL MEDICINE DEPARTMENT AT [address] [place] [address]  DISCHARGE SUMMARY    Patient ID: Austyn Hernandez                                             Discharge Date: 9/20/2022   Patient's PCP: Referring Not In System (Inactive)                                          Discharge Physician: Gordon Dhaliwal MD MD  Admit Date: 9/19/2022   Admitting Physician: Jignesh Cordova MD    PROBLEMS DURING HOSPITALIZATION:  Present on Admission:   COPD exacerbation ([address]      DISCHARGE DIAGNOSES:    HPI:  Austyn Hernandez is a 58 y.o. female with medical history of type 2 diabetes, hypertension, obesity, MARIS versus OHS, previous PE on eliquis, diastolic congestive heart failure with an ejection fraction of 55 to 60% on echocardiogram from 2015 and COPD who presents with complaints of increasing shortness of breath over the last several days. Patient states that the shortness of breath is worse with activity and exertion. Patient states that she is unable to walk up a flight of stairs without becoming winded. Patient states that she does have inhalers at home for COPD treatment but has not had to use them more frequently than normal over the last several days. Patient states that she is unable to sleep lying down flat as she is short of breath. States that she must sleep upright using 3-4 pillows to achieve this. Patient also states that she wakes up in the middle of the night coughing and short of breath. Patient states that her weight is up recently between 10 to 20 pounds. States as well that she has noticed that her bilateral lower extremities have been significantly more swollen than normal.  Patient states that recently she has noticed an increase in sputum expectoration. States that the sputum is generally white in color. Otherwise denies nausea, vomiting, diarrhea, chest pain, palpitations, night sweats, fevers, or recent sick contacts.      Patient states that she has generally been very compliant with her medication regimen. States that she does not miss doses of her Bumex treatment. Also states that she tries to adhere to a low-sodium diet. There are no open changes to her recent diet over the last several days coinciding with the onset of her symptoms. States that she uses a CPAP to sleep at home and has been compliant with that treatment. It is unclear how compliant the patient has been as she ostensibly mentions to ED staff that she had not been compliant. When speaking to the admitting team however, she states that she has been completely compliant with her medications as they have been prescribed to her by her providers. Patient states that she is not on at home oxygen at baseline for her COPD. The following issues were addressed during hospitalization:    COPD Exacerbation:  The patient was admitted with likely COPD exacerbation given her recent shortness of breath, increased sputum expectoration. Patient was started on albuterol and DuoNeb breathing treatments. Patient was given an azithromycin Z-Rob, and patient was started on prednisone 40 mg daily. Patient was also placed on a BiPAP for increased ventilation given her hypercapnia. Patient stated that these interventions have significantly helped her. Patient was able to breathe more easily even after 1 day of this treatment. Diastolic CHF exacerbation  Patient has had significant bilateral lower extremity edema, orthopnea, and paroxysmal nocturnal dyspnea worsening over the last couple of weeks. Patient's laboratory studies in the ED also notable for a proBNP of around 1400. Patient started on Lasix IV twice daily for diuresis. Strict BARBARA's were noted while she was an inpatient. Patient's progress is also monitored with weight measurements. Patient was requiring 2 L via nasal cannula upon presentation. We obtained an updated echocardiogram for her as her most recent echocardiogram was in 2015.   Pharmacy was also consulted for formal medication reconciliation as there was some confusion as to which medication she had actually been taking prior to presentation. DM2  Patient was put on a sliding scale while she was an inpatient. Basal insulin was added as needed. We also had the patient on a diabetic diet with low carbs. MARIS vs OHS  The patient's at home CPAP regimen was continued while she was an inpatient. Patient stated that she was able to get sleep with the use of the CPAP. Hypertension  Hydralazine 10 mg as needed was added to maintain systolic blood pressure below 180 while the patient was here in the hospital.  Electrolyte Abnormalities\"  Electrolyte abnormalities including low sodium, potassium, and magnesium were repleted while she was here as an inpatient. Physical Exam:  /74   Pulse 82   Temp 98.3 °F (36.8 °C) (Oral)   Resp 22   Ht 5' 3\" (1.6 m)   Wt (!) 351 lb 10.1 oz (159.5 kg)   SpO2 100%   BMI 62.29 kg/m²       Consults: cardiology  Significant Diagnostic Studies:  CT scan Chest, CXR  Treatments: anticoagulation: Eliquis, IV Lasix, Azithromycin, Prednisone  Disposition: home  Discharged Condition: Stable  Follow Up: Primary Care Physician in two weeks    DISCHARGE MEDICATION:     Medication List        ASK your doctor about these medications      albuterol sulfate  (90 Base) MCG/ACT inhaler  Commonly known as: PROVENTIL;VENTOLIN;PROAIR  Inhale 2 puffs into the lungs every 6 hours as needed for Wheezing.      apixaban 5 MG Tabs tablet  Commonly known as: ELIQUIS     atorvastatin 20 MG tablet  Commonly known as: LIPITOR     * bumetanide 1 MG tablet  Commonly known as: BUMEX     * bumetanide 1 MG tablet  Commonly known as: BUMEX     nitroGLYCERIN 0.4 MG SL tablet  Commonly known as: NITROSTAT     Symbicort 160-4.5 MCG/ACT Aero  Generic drug: budesonide-formoterol     tiotropium 18 MCG inhalation capsule  Commonly known as: SPIRIVA     traMADol 50 MG tablet  Commonly known as: Ultram  Take 1 tablet by mouth every 6 hours as needed for Pain     Trulicity 4.5 XO/1.7DT Sopn  Generic drug: Dulaglutide           * This list has 2 medication(s) that are the same as other medications prescribed for you. Read the directions carefully, and ask your doctor or other care provider to review them with you.                 Activity: activity as tolerated  Diet: low fat, low cholesterol diet  Wound Care: none needed    Time Spent on discharge is more than 45 minutes    Gustavo Lawrence MD  PGY1, Internal Medicine  09/20/22  10:52 AM

## 2022-09-20 NOTE — PROGRESS NOTES
4 Eyes Admission Assessment     I agree as the admission nurse that 2 RN's have performed a thorough Head to Toe Skin Assessment on the patient. ALL assessment sites listed below have been assessed on admission. Areas assessed by both nurses: Rozann Rattler  [x]   Head, Face, and Ears   [x]   Shoulders, Back, and Chest - surgical scar mid abdomen  [x]   Arms, Elbows, and Hands   [x]   Coccyx, Sacrum, and Ischium  [x]   Legs, Feet, and Heels        Does the Patient have Skin Breakdown?   No         Paul Prevention initiated:  No   Wound Care Orders initiated:  No      C nurse consulted for Pressure Injury (Stage 3,4, Unstageable, DTI, NWPT, and Complex wounds) or Paul score 18 or lower:  No      Nurse 1 eSignature: Electronically signed by Sonam Samuel RN on 9/19/22 at 9:18 PM EDT    **SHARE this note so that the co-signing nurse is able to place an eSignature**    Nurse 2 eSignature: Electronically signed by Janna Weaver RN on 9/19/22 at 9:18 PM EDT

## 2022-09-20 NOTE — DISCHARGE INSTRUCTIONS
Please follow up with your PCP in 1 week. Extra Heart Failure sites:   [email or web address] --- this is American Heart Association interactive Healthier Living with Heart Failure guidebook. Please copy and paste link into search bar. Use your mouse to scroll through the pages. Lots and lots of info / tips    HF [place] brian  --- free smart phone brian available for [employer] and [employer]. Use your phone to track sodium / fluid intake,  symptoms, weight, etc.    [employer]. [employer] - website-- [employer] is a dialysis [employer]. All dialysis patients follow a renal diet which IS low sodium!! This website offers free seasonal cookbooks.   Each quarter, they will release 25-30 new recipes with a breakdown of calories, sodium, glucose, etc    [email or web address] -- more free recipes

## 2022-09-20 NOTE — CONSULTS
Pharmacy consulted to clarify if patient is taking apixiban or warfarin at home. Patient reports to RN that she takes apixiban and not warfarin at home. Patient reports she did take her AM dose today (9/19)  but has not yet taken her PM dose.      Arianne Dickey, PharmD, [facility] 9/19/2022 9:02 PM

## 2022-09-20 NOTE — CARE COORDINATION
Case Management Assessment           Initial Evaluation                Date / Time of Evaluation: 9/20/2022 4:10 PM                 Assessment Completed by: Pio Sauer RN    Patient Name: Whit Liu     YOB: 1960  Diagnosis: COPD exacerbation ([address] [J44.1]  Acute congestive heart failure, unspecified heart failure type [facility]) [I50.9]     Date / Time: 9/19/2022 11:37 AM    Patient Admission Status: Inpatient    If patient is discharged prior to next notation, then this note serves as note for discharge by case management. Current PCP: Referring Not In System (Inactive)  Clinic Patient: No    Chart Reviewed: Yes  Patient/ Family Interviewed: Yes    Initial assessment completed at bedside with: patient    Hospitalization in the last 30 days: No    Emergency Contacts:  Extended Emergency Contact Information  Primary Emergency Contact: [address] Phone: [phone]  Work Phone: [phone]  Mobile Phone: [phone]  Relation: Brother/Sister  Secondary Emergency Contact:  Payam Barnhart  [place] Phone: [phone]  Relation: Brother/Sister    Advance Directives:   Code Status: Full Code    Healthcare Power of : No    Financial  Payor: Roshan Garcia / Plan: Jane Wong COMPLETE / Product Type: *No Product type* /     Pre-cert required for SNF: Yes    Pharmacy    [address] [phone]  [address]  Phone: [phone] Fax: [phone]    [facility]  [facility] [place] [phone] [facility] [phone]  [address]  [address] [place] [address]  Phone: [phone] Fax: [phone]      Potential assistance Purchasing Medications: Potential Assistance Purchasing Medications: No  Does Patient want to participate in local refill/ meds to beds program?:      Meds To [address] [place] [address] Rules: 1. Can ONLY be done Monday- Friday between 8:30am-5pm  2. Prescription(s) must be in pharmacy by 3pm to be filled same day  3. Copy of patient's insurance/ prescription drug card and patient face sheet must be sent along with the prescription(s)  4. Cost of Rx cannot be added to hospital bill. If financial assistance is needed, please contact unit  or ;  or  CANNOT provide pharmacy voucher for patients co-pays  5. Patients can then  the prescription on their way out of the hospital at discharge, or pharmacy can deliver to the bedside if staff is available. (payment due at time of pick-up or delivery - cash, check, or card accepted)     Able to afford home medications/ co-pay costs: Yes    ADLS  Support Systems: Children    PT AM-PAC: 18 /24  OT AM-PAC: 18 /24    HOUSING  Home Environment: from home in [place] with son and sherlyn,currently staying at [facility] [facility] here in [facility]  Steps: none    Plans to RETURN to current housing: Yes  Barriers to RETURNING to current housing: medical complications   Durable Medical Equipment  DME Provider: n/a  Equipment: cane and rollator    Home Oxygen and [address] prior to admission: No  [address]: Not Applicable  Other Respiratory Equipment: CPAP at night     DISCHARGE PLAN:  Disposition: Home- No Services Needed    Transportation PLAN for discharge: family     Factors facilitating achievement of predicted outcomes: Family support, Cooperative, Pleasant, and Has needed Durable Medical Equipment at home    Barriers to discharge: Medical complications and Medication managment    Additional Case Management Notes: Pt is visiting sister here in [facility], but lives in [place] with her son and sherlyn. Pt has cpap at night and rollator and cane. Plan is to return to [place] and then back to [place] when medically stable.      The Plan for Transition of Care is related to the following treatment goals of COPD exacerbation ([facility] [J44.1]  Acute congestive heart failure, unspecified heart failure type ([facility] [I50.9]    The Patient and/or patient representative Genna Fontanez and her family were provided with a choice of provider and agrees with the discharge plan Yes    Freedom of choice list was provided with basic dialogue that supports the patient's individualized plan of care/goals and shares the quality data associated with the providers.  Yes    Care Transition patient: No    Regina Martinez RN  [facility]  Case Management Department  Ph: [phone]   Fax: [phone]

## 2022-09-20 NOTE — PROGRESS NOTES
Occupational Therapy  Facility/Department: [place] [address] [facility]  Occupational Therapy Initial Assessment    Name: Antony Palomo  : 1960  MRN: [id number]  Date of Service: 2022    Discharge Recommendations: Antony Palomo scored a 18/24 on the AM-PAC ADL Inpatient form. Current research shows that an AM-PAC score of 18 or greater is typically associated with a discharge to the patient's home setting. Based on the patient's AM-PAC score, and their current ADL deficits, it is recommended that the patient have 2-3 sessions per week of Occupational Therapy at d/c to increase the patient's independence. At this time, this patient demonstrates the endurance and safety to discharge home with home services and a follow up treatment frequency of 2-3x/wk. Please see assessment section for further patient specific details. If patient discharges prior to next session this note will serve as a discharge summary. Please see below for the latest assessment towards goals. OT Equipment Recommendations  Equipment Needed: No  Other: pt has recommended shower chair and sock aid       Patient Diagnosis(es): The primary encounter diagnosis was COPD exacerbation ([address] A diagnosis of Acute congestive heart failure, unspecified heart failure type [facility]) was also pertinent to this visit. Past Medical History:  has a past medical history of COPD (chronic obstructive pulmonary disease) ([address] Diabetes mellitus ([address] Diastolic congestive heart failure ([address] Hypertension, Pulmonary embolism ([address] Sleep apnea, and Subdural hematoma ([address] Past Surgical History:  has a past surgical history that includes hiatal hernia repair (2022);  section; Appendectomy; laparoscopy; Hysterectomy; and Hernia mesh removal.    Treatment Diagnosis: decreased endurance with ADLs and fx mobility      Assessment   Performance deficits / Impairments: Decreased functional mobility ; Decreased endurance;Decreased ADL status  Assessment: Pt is 57 yo female admitted for COPD exacerbation. Pt lives in [place] and was in town for family . Pt is typically independent with ADLs and fx mobility with rollator. Pt on 4L of O2 stating between 93-96% throughout session and wears home O2 at baseline. Pt required CGA for transfers and fx mobility with increased time and effortful. Pt able to perform UE ADLs but needing increased A for LB ADLs and toileting. Pt has decreased overal ax endurance and would benefit from home health OT at discharge. Will cont to follow on acute OT POC. Treatment Diagnosis: decreased endurance with ADLs and fx mobility  Prognosis: Good  Decision Making: Medium Complexity  Activity Tolerance  Activity Tolerance: Patient Tolerated treatment well;Patient limited by fatigue  Activity Tolerance Comments: spO2 flucuated 93-96% while on 4L of O2        Plan   Plan  Times per Week: 2-5  Current Treatment Recommendations: Endurance training     Restrictions  Position Activity Restriction  Other position/activity restrictions: up with assist    Subjective   General  Chart Reviewed: Yes  Patient assessed for rehabilitation services?: Yes  Additional Pertinent Hx: 58 y.o. female with medical history of type 2 diabetes, hypertension, obesity, MARIS versus OHS, previous PE on eliquis, diastolic congestive heart failure with an ejection fraction of 55 to 60% on echocardiogram from 2015 and COPD who presents with complaints of increasing shortness of breath over the last several days. Admitted for COPD exacerbation  Referring Practitioner: Saima Bianchi MD  Diagnosis: COPD Exacerbation  Subjective  Subjective: Pt lying supine in bed upon OT arrival on bipap. Pt agreeable to OT evaluation. Pt stating, \"I am in town from [place]. My brother \". Pt also stating, \"I was supposed to get home therapy the last time and never did. I just want to make insurance will cover it\".      Social/Functional History  Social/Functional History  Lives With: Son (daughter in law)  Type of Home: House  Home Layout: Able to Live on Main level with bedroom/bathroom, Multi-level  Home Access: Stairs to enter without rails  Entrance Stairs - Number of Steps: 1 (uses door frame)  Bathroom Shower/Tub: Walk-in shower  Bathroom Toilet: Standard (uses sink to push up from)  Bathroom Equipment: Shower chair  Home Equipment: Linsey Bradley, 4 wheeled, Walker, [address] bed, Lift chair, Oxygen (c-pap 3Lo2 at night)  Has the patient had two or more falls in the past year or any fall with injury in the past year?: No  Receives Help From: Family  ADL Assistance: Independent  Homemaking Assistance: Needs assistance (son and daughter in law perform cooking and cleaning, pt can assist with laundry)  Homemaking Responsibilities: Yes  Ambulation Assistance: Independent (uses 8MF)  Transfer Assistance: Independent  Active : Yes  Additional Comments: son abd daughter in law work 16 hours aday and pt is alone       Objective   Heart Rate: 68  Heart Rate Source: Monitor  BP: 122/63  BP Location: Right upper arm  BP Method: Automatic  Patient Position: Up in chair  MAP (Calculated): 82.67  Resp: 14  SpO2: 100 %  O2 Device: PAP (positive airway pressure)          Observation/Palpation  Observation: on bipap upon OT arrival spO2 99%; pt then placed 4L of O2 via nasal cannula once bipad was doffed and stated 93-96%  Safety Devices  Type of Devices: Call light within reach; Left in chair;Nurse notified; Chair alarm in place;Gait belt  Balance  Sitting: Intact  Standing: Impaired (requires holding onto arm rest of chair, bed rail or use of 2WW)  Standing - Static: Constant support  Standing - Dynamic: Constant support  Transfer Training  Transfer Training: Yes  Sit to Stand: Contact-guard assistance  Stand to Sit: Contact-guard assistance  Bed to Chair: Contact-guard assistance  Toilet Transfer: Contact-guard assistance (use of grab bar)  Gait  Overall Level of Assistance: Contact-guard assistance (to ambulate to/from bathroom with 2WW, slow and effortful, therapist performed line mngmt of IV pole and O2 tubing throughout session)  Assistive Device: Walker, rolling     AROM: Within functional limits  Strength: Within functional limits  Coordination: Within functional limits  Tone: Normal  Sensation: Intact  ADL  Feeding: Independent  Grooming: Setup; Independent  UE Bathing: Setup;Stand by assistance  UE Bathing Skilled Clinical Factors: assistance to wipe back off  LE Bathing: Moderate assistance  LE Bathing Skilled Clinical Factors: for LE with warm bath wipes  UE Dressing: Minimal assistance  UE Dressing Skilled Clinical Factors: to exchange gowns past IV line on RUE  LE Dressing: Maximum assistance  LE Dressing Skilled Clinical Factors: to don socks and new brief; pt reports having sock aid at home  Toileting:  Moderate assistance  Toileting Skilled Clinical Factors: mod A for brief mngmt after pt voided on commode; pt using a purwick in the chair and bed     Activity Tolerance  Activity Tolerance: Patient tolerated evaluation without incident;Patient limited by endurance  Activity Tolerance Comments: rest breaks with activity  Bed mobility  Supine to Sit: Stand by assistance (inc time/effort)  Scooting: Stand by assistance     Vision  Vision: Impaired  Vision Exceptions: Wears glasses for reading  Hearing  Hearing: Within functional limits  Cognition  Overall Cognitive Status: WFL  Orientation  Overall Orientation Status: Within Normal Limits  Orientation Level: Oriented X4                  Education Given To: Patient  Education Provided: Role of Therapy;Plan of Care;Energy Conservation;Transfer Training  Education Method: Verbal  Barriers to Learning: None  Education Outcome: Verbalized understanding                        G-Code     OutComes Score                                                  AM-PAC Score        AM-PAC Inpatient Daily Activity Raw Score: 18

## 2022-09-20 NOTE — PROGRESS NOTES
Physician Progress Note      PATIENT:               Rebecca Felder  CSN #:                  [id number]  :                       1960  ADMIT DATE:       2022 11:37 AM  DISCH DATE:  Azucena Oleary  PROVIDER #:        Rico Martinez TEXT:    Patient admitted with COPD and CHF exacerbations. Noted documentation of acute   respiratory failure in the H&P. In order to support the diagnosis of acute   respiratory failure, please include additional clinical indicators in your   documentation. Or please document if the diagnosis of acute respiratory   failure has been ruled out after further study. The medical record reflects the following:  Risk Factors: 57 yo w/ CHF and COPD exacerbations normally on room air  Clinical Indicators: Per H&P addendum:  Acute on chronic hypercapnic resp   failure. Breath sounds: Normal air entry. Wheezing and rhonchi present. Per   ED:  Effort: Pulmonary effort is normal. No respiratory distress. Treatment: 2L 02, CPAP    Acute Respiratory Failure Clinical Indicators per 3[employer] MS-DR[employer] Training Guide and   Quick Reference Guide:  pO2 < 60 mmHg or SpO2 (pulse oximetry) < 91% breathing room air  pCO2 > 50 and pH < 7.35  P/F ratio (pO2 / FIO2) < 300  pO2 decrease or pCO2 increase by 10 mmHg from baseline (if known)  Supplemental oxygen of 40% or more  Presence of respiratory distress, tachypnea, dyspnea, shortness of breath,   wheezing  Unable to speak in complete sentences  Use of accessory muscles to breathe  Extreme anxiety and feeling of impending doom  Tripod position  Confusion/altered mental status/obtunded  Options provided:  -- Acute Respiratory Failure ruled out after study  -- Acute Respiratory Failure as evidenced by, Please document evidence.   -- Other - I will add my own diagnosis  -- Disagree - Not applicable / Not valid  -- Disagree - Clinically unable to determine / Unknown  -- Refer to Clinical Documentation Reviewer    PROVIDER RESPONSE TEXT:    This patient is in acute respiratory failure as evidenced by Hypercapnia    Query created by: Katelin Tam on 9/20/2022 6:59 AM      Electronically signed by:  Dulce Lesch 9/20/2022 12:46 PM

## 2022-09-20 NOTE — PLAN OF CARE
No falls noted thus far this shift, bed in lowest position, alarm on, non-skid socks on, call light within reach, hourly checks, safety maintained, will continue to monitor. Problem: Safety - Adult  Goal: Free from fall injury  Outcome: Progressing  Flowsheets (Taken 9/19/2022 2100)  Free From Fall Injury: Instruct family/caregiver on patient safety     Pt reported to RN on admission that she often doesn't take her Bumex diuretic if she knows she is going out or doing something that day. Multiple meds she reported it had been more than a week since she took. Problem: Chronic Conditions and Co-morbidities  Goal: Patient's chronic conditions and co-morbidity symptoms are monitored and maintained or improved  Outcome: Progressing     Pt has been on continuous bipap throughout entire admission to floor, except mask to come off briefly to 2L o2 per NC for admission questions and medications. Problem: Respiratory - Adult  Goal: Achieves optimal ventilation and oxygenation  Outcome: Progressing     Pt has been voiding successfully with external catheter, helping w/ strict I/Os.     Problem: Metabolic/Fluid and Electrolytes - Adult  Goal: Electrolytes maintained within normal limits  Recent Flowsheet Documentation  Taken 9/19/2022 2029 by Frandy Raymond RN  Electrolytes maintained within normal limits:   Monitor labs and assess patient for signs and symptoms of electrolyte imbalances   Instruct patient on fluid and nutrition restrictions as appropriate

## 2022-09-20 NOTE — PROGRESS NOTES
Internal Medicine Progress Note    Admit Date: 9/19/2022  Day: 1   Diet: ADULT DIET; Regular; Low Fat/Low Chol/High Fiber/2 gm Na    CC: Shortness of breath    Interval history: Patient was interviewed at the bedside this morning. Patient was resting comfortably in bed with her BiPAP on. Patient states that she had some headaches overnight. Otherwise no chest pain. States that shortness of breath is improving on current medical treatment. Patient has no new complaints at this time. Telemetry appreciated-largely stable. Vitals appreciated and were stable. HPI:  Azeb Sevilla is a 58 y.o. female with medical history of type 2 diabetes, hypertension, obesity, MARIS versus OHS, previous PE on eliquis, diastolic congestive heart failure with an ejection fraction of 55 to 60% on echocardiogram from 2015 and COPD who presents with complaints of increasing shortness of breath over the last several days. Patient states that the shortness of breath is worse with activity and exertion. Patient states that she is unable to walk up a flight of stairs without becoming winded. Patient states that she does have inhalers at home for COPD treatment but has not had to use them more frequently than normal over the last several days. Patient states that she is unable to sleep lying down flat as she is short of breath. States that she must sleep upright using 3-4 pillows to achieve this. Patient also states that she wakes up in the middle of the night coughing and short of breath. Patient states that her weight is up recently between 10 to 20 pounds. States as well that she has noticed that her bilateral lower extremities have been significantly more swollen than normal.  Patient states that recently she has noticed an increase in sputum expectoration. States that the sputum is generally white in color.   Otherwise denies nausea, vomiting, diarrhea, chest pain, palpitations, night sweats, fevers, or recent sick likely COPD exacerbation superimposed on diastolic heart failure exacerbation. The course of treatment with inpatient hospitalization and expected medication regimen was discussed with the patient who expressed understanding and agreement with the course of treatment. All of the patient's questions were answered in the ED. Medications:     Scheduled Meds:   predniSONE  40 mg Oral Daily    magnesium sulfate  1,000 mg IntraVENous Once    budesonide  0.5 mg Nebulization BID    furosemide  40 mg IntraVENous BID    [START ON 9/23/2022] Dulaglutide  4.5 mg SubCUTAneous Weekly    apixaban  5 mg Oral BID    sodium chloride flush  5-40 mL IntraVENous 2 times per day    pantoprazole  40 mg Oral QAM AC    atorvastatin  20 mg Oral Daily     Continuous Infusions:   sodium chloride       PRN Meds:sodium chloride flush, sodium chloride, ondansetron **OR** ondansetron, polyethylene glycol, acetaminophen **OR** acetaminophen, perflutren lipid microspheres    Objective:   Vitals:   T-max:  Patient Vitals for the past 8 hrs:   BP Temp Temp src Pulse Resp SpO2 Weight   09/20/22 0441 (!) 96/58 98 °F (36.7 °C) Axillary 78 16 98 % (!) 351 lb 10.1 oz (159.5 kg)   09/20/22 0425 -- -- -- 78 16 97 % --   09/20/22 0105 -- -- -- 77 17 96 % --   09/20/22 0049 134/74 98.7 °F (37.1 °C) Axillary 78 17 98 % --       Intake/Output Summary (Last 24 hours) at 9/20/2022 0805  Last data filed at 9/20/2022 0600  Gross per 24 hour   Intake 330 ml   Output 1400 ml   Net -1070 ml       Physical Exam  Constitutional:       General: She is awake. Appearance: She is morbidly obese. Eyes:      Extraocular Movements: Extraocular movements intact. Cardiovascular:      Rate and Rhythm: Normal rate and regular rhythm. Pulses:           Radial pulses are 2+ on the right side and 2+ on the left side. Heart sounds: Normal heart sounds, S1 normal and S2 normal. Heart sounds not distant.       Comments: Painful bilateral lower extremities to palpation  Abdominal:      General: Abdomen is protuberant. Palpations: Abdomen is soft. Tenderness: There is abdominal tenderness in the right upper quadrant. Musculoskeletal:      Right lower le+ Pitting Edema present. Left lower le+ Pitting Edema present. Neurological:      Mental Status: She is alert and oriented to person, place, and time. Psychiatric:         Behavior: Behavior is cooperative. LABS:    CBC:   Recent Labs     22  1226 22  0510   WBC 5.1 5.0   HGB 11.0* 11.2*   HCT 33.8* 35.3*    253   MCV 79.3* 81.7     Renal:    Recent Labs     22  1226 22  0510    143   K 3.5 4.4   CL 99 99   CO2 30 34*   BUN 10 9   CREATININE 0.5* 0.5*   GLUCOSE 161* 129*   CALCIUM 9.1 8.8   MG 1.60* 1.90   ANIONGAP 11 10     Hepatic:   Recent Labs     22  1226   AST 20   ALT 22   BILITOT 0.6   PROT 6.5   LABALBU 3.7   ALKPHOS 114     Troponin:   Recent Labs     22  1226   TROPONINI <0.01     BNP: No results for input(s): BNP in the last 72 hours. Lipids: No results for input(s): CHOL, HDL in the last 72 hours. Invalid input(s): LDLCALCU, TRIGLYCERIDE  ABGs:  No results for input(s): PHART, BED4JSM, PO2ART, XBX6YQY, BEART, THGBART, X2CGMEVN, OSD1KRV in the last 72 hours. INR: No results for input(s): INR in the last 72 hours. Lactate: No results for input(s): LACTATE in the last 72 hours. Cultures:  -----------------------------------------------------------------  RAD:   XR CHEST (2 VW)   Final Result   Impression: Stable cardiomegaly. Mild pulmonary vascular congestion.             Assessment/Plan:   Austyn Hernandez is a 58 y.o. female with a past medical history of type 2 diabetes, hypertension, obesity, MARIS versus OHS, previous PE on apixaban, diastolic congestive heart failure with an ejection fraction of 55 to 60% on echocardiogram from 2015, and COPD who presents with increasing shortness of breath and fatigue on

## 2022-09-20 NOTE — PROGRESS NOTES
presenting with SOB now on 4Lo2 and bipap overnight. pt is SBA for bed mobility, CGA for transfers and short distance ambulation with RW. pt with poor endurance requiring frequent rest breaks, o2 sats stable throughout on 4Lo2. pt expresses no concerns for returning home and will benefit from home PT to gain strength. PT to f/u  Treatment Diagnosis: dec functional mobility  Therapy Prognosis: Good  Decision Making: Medium Complexity  Requires PT Follow-Up: Yes  Activity Tolerance  Activity Tolerance: Patient tolerated evaluation without incident;Patient limited by endurance  Activity Tolerance Comments: rest breaks with activity     Plan   Plan  Plan:  (2-5)  Current Treatment Recommendations: Strengthening, Balance training, Functional mobility training, Transfer training, Gait training, Endurance training, Safety education & training, Patient/Caregiver education & training, Therapeutic activities, Return to work related activity, Neuromuscular re-education  Safety Devices  Type of Devices: Call light within reach, Left in chair, Nurse notified, Chair alarm in place, Gait belt     Restrictions  Position Activity Restriction  Other position/activity restrictions: up with assist     Subjective   General  Chart Reviewed:  Yes  Additional Pertinent Hx: pt is a 59 yo female presenting with worsening SOB worse with exertion  Referring Practitioner: Vania Bains MD  Diagnosis: COPD exacerbation  Follows Commands: Within Functional Limits  Subjective  Subjective: pt supine in bed and agreeable to PT         Social/Functional History  Social/Functional History  Lives With: Son (daughter in law)  Type of Home: House  Home Layout: Able to Live on Main level with bedroom/bathroom, Multi-level  Home Access: Stairs to enter without rails  Entrance Stairs - Number of Steps: 1 (uses door frame)  Bathroom Shower/Tub: Walk-in shower  Bathroom Toilet: Standard (uses sink to push up from)  [employer]: Shower chair  Home Equipment: [employer], 4 wheeled, Walker, rolling, Hospital bed, Lift chair, Oxygen (c-pap 3Lo2 at night)  Has the patient had two or more falls in the past year or any fall with injury in the past year?: No  Receives Help From: Family  ADL Assistance: Independent  Homemaking Assistance: Needs assistance (son and daughter in law perform cooking and cleaning, pt can assist with laundry)  Homemaking Responsibilities: Yes  Ambulation Assistance: Independent (uses 9UA)  Transfer Assistance: Independent  Active : Yes  Additional Comments: son abd daughter in law work 16 hours aday and pt is alone  Vision/Hearing  Vision  Vision: Impaired  Vision Exceptions: Wears glasses for reading  Hearing  Hearing: Within functional limits    Cognition   Orientation  Overall Orientation Status: Within Normal Limits  Orientation Level: Oriented X4  Cognition  Overall Cognitive Status: WFL     Objective   Heart Rate: 68  Heart Rate Source: Monitor  BP: 122/63  BP Location: Right upper arm  BP Method: Automatic  Patient Position: Up in chair  MAP (Calculated): 82.67  Resp: 14  SpO2: 100 %  O2 Device: PAP (positive airway pressure)                             Bed mobility  Supine to Sit: Stand by assistance (inc time/effort)  Scooting: Stand by assistance  Transfers  Sit to Stand: Contact guard assistance  Stand to sit: Contact guard assistance  Stand Pivot Transfers: Contact guard assistance  Comment: at RW from EOB, recliner, and toilet with inc effort  Ambulation  Surface: level tile  Device: Rolling Walker  Other Apparatus: O2 (4Lo2)  Quality of Gait: mildly unsteady gait with waddline pattern and dec benji- likely baseline  Gait Deviations: Slow Benji; Shuffles;Decreased step height;Decreased step length  Distance: 10'x2  Comments: poor tolerance due to fatigue- o2 sats 94-96% on 4Lo2     Balance  Posture: Good  Sitting - Static: Good  Sitting - Dynamic: Fair;+  Standing - Static: Fair  Standing - Dynamic: Fair  Comments: sitting balance EOB for gown change and ADLs SBA, standing balance CGA for pericare and brief change at RW           OutComes Score                                                  AM-PAC Score  AM-PAC Inpatient Mobility Raw Score : 18 (09/20/22 1322)  AM-PAC Inpatient T-Scale Score : 43.63 (09/20/22 1322)  Mobility Inpatient CMS 0-100% Score: 46.58 (09/20/22 1322)  Mobility Inpatient CMS G-Code Modifier : CK (09/20/22 1322)          Tinneti Score       Goals  Short Term Goals  Time Frame for Short term goals: by dc  Short term goal 1: pt will perform bed mobility with mod I  Short term goal 2: pt will perform functional transfers with LRAD and mod I  Short term goal 3: pt will ambulate 48' with LRAD and mod I  Patient Goals   Patient goals : to go home       Education  Patient Education  Education Given To: Patient  Education Provided: Role of Therapy;Plan of Care  Education Method: Demonstration;Verbal  Barriers to Learning: None  Education Outcome: Verbalized understanding      Therapy Time   Individual Concurrent Group Co-treatment   Time In 0928         Time Out 1021         Minutes 53              Timed Code Treatment Minutes:  38 min     Total Treatment Minutes:  48 min       Eren Brown PT

## 2022-09-21 ENCOUNTER — APPOINTMENT (OUTPATIENT)
Dept: VASCULAR LAB | Age: 62
DRG: 291 | End: 2022-09-21
Payer: MEDICARE

## 2022-09-21 LAB
ANION GAP SERPL CALCULATED.3IONS-SCNC: 7 MMOL/L (ref 3–16)
BASOPHILS ABSOLUTE: 0 K/UL (ref 0–0.2)
BASOPHILS RELATIVE PERCENT: 0.2 %
BUN BLDV-MCNC: 11 MG/DL (ref 7–20)
CALCIUM SERPL-MCNC: 8.8 MG/DL (ref 8.3–10.6)
CHLORIDE BLD-SCNC: 94 MMOL/L (ref 99–110)
CO2: 41 MMOL/L (ref 21–32)
CREAT SERPL-MCNC: 0.6 MG/DL (ref 0.6–1.2)
EOSINOPHILS ABSOLUTE: 0 K/UL (ref 0–0.6)
EOSINOPHILS RELATIVE PERCENT: 0.2 %
GFR AFRICAN AMERICAN: >60
GFR NON-AFRICAN AMERICAN: >60
GLUCOSE BLD-MCNC: 121 MG/DL (ref 70–99)
HCT VFR BLD CALC: 31.7 % (ref 36–48)
HEMOGLOBIN: 10.3 G/DL (ref 12–16)
LV EF: 58 %
LVEF MODALITY: NORMAL
LYMPHOCYTES ABSOLUTE: 1.4 K/UL (ref 1–5.1)
LYMPHOCYTES RELATIVE PERCENT: 24.7 %
MAGNESIUM: 1.8 MG/DL (ref 1.8–2.4)
MCH RBC QN AUTO: 25.9 PG (ref 26–34)
MCHC RBC AUTO-ENTMCNC: 32.5 G/DL (ref 31–36)
MCV RBC AUTO: 79.7 FL (ref 80–100)
MONOCYTES ABSOLUTE: 0.4 K/UL (ref 0–1.3)
MONOCYTES RELATIVE PERCENT: 6.8 %
NEUTROPHILS ABSOLUTE: 3.8 K/UL (ref 1.7–7.7)
NEUTROPHILS RELATIVE PERCENT: 68.1 %
PDW BLD-RTO: 17.1 % (ref 12.4–15.4)
PLATELET # BLD: 241 K/UL (ref 135–450)
PMV BLD AUTO: 8.1 FL (ref 5–10.5)
POTASSIUM REFLEX MAGNESIUM: 3.6 MMOL/L (ref 3.5–5.1)
RBC # BLD: 3.98 M/UL (ref 4–5.2)
SODIUM BLD-SCNC: 142 MMOL/L (ref 136–145)
WBC # BLD: 5.6 K/UL (ref 4–11)

## 2022-09-21 PROCEDURE — 2700000000 HC OXYGEN THERAPY PER DAY

## 2022-09-21 PROCEDURE — 6370000000 HC RX 637 (ALT 250 FOR IP)

## 2022-09-21 PROCEDURE — 93970 EXTREMITY STUDY: CPT

## 2022-09-21 PROCEDURE — 80048 BASIC METABOLIC PNL TOTAL CA: CPT

## 2022-09-21 PROCEDURE — 94660 CPAP INITIATION&MGMT: CPT

## 2022-09-21 PROCEDURE — 93306 TTE W/DOPPLER COMPLETE: CPT

## 2022-09-21 PROCEDURE — 85025 COMPLETE CBC W/AUTO DIFF WBC: CPT

## 2022-09-21 PROCEDURE — 6360000002 HC RX W HCPCS

## 2022-09-21 PROCEDURE — 2580000003 HC RX 258

## 2022-09-21 PROCEDURE — 36415 COLL VENOUS BLD VENIPUNCTURE: CPT

## 2022-09-21 PROCEDURE — 94640 AIRWAY INHALATION TREATMENT: CPT

## 2022-09-21 PROCEDURE — 83735 ASSAY OF MAGNESIUM: CPT

## 2022-09-21 PROCEDURE — 94761 N-INVAS EAR/PLS OXIMETRY MLT: CPT

## 2022-09-21 PROCEDURE — 2060000000 HC ICU INTERMEDIATE R&B

## 2022-09-21 PROCEDURE — 6370000000 HC RX 637 (ALT 250 FOR IP): Performed by: INTERNAL MEDICINE

## 2022-09-21 RX ORDER — ACETAMINOPHEN 325 MG/1
650 TABLET ORAL EVERY 6 HOURS PRN
Status: DISCONTINUED | OUTPATIENT
Start: 2022-09-21 | End: 2022-09-22 | Stop reason: HOSPADM

## 2022-09-21 RX ORDER — MAGNESIUM SULFATE IN WATER 40 MG/ML
2000 INJECTION, SOLUTION INTRAVENOUS ONCE
Status: COMPLETED | OUTPATIENT
Start: 2022-09-21 | End: 2022-09-21

## 2022-09-21 RX ORDER — BUMETANIDE 1 MG/1
1 TABLET ORAL 2 TIMES DAILY
Status: DISCONTINUED | OUTPATIENT
Start: 2022-09-21 | End: 2022-09-22 | Stop reason: HOSPADM

## 2022-09-21 RX ORDER — ACETAMINOPHEN 650 MG/1
650 SUPPOSITORY RECTAL EVERY 6 HOURS PRN
Status: DISCONTINUED | OUTPATIENT
Start: 2022-09-21 | End: 2022-09-22 | Stop reason: HOSPADM

## 2022-09-21 RX ADMIN — BUMETANIDE 1 MG: 1 TABLET ORAL at 08:57

## 2022-09-21 RX ADMIN — PREDNISONE 40 MG: 20 TABLET ORAL at 08:45

## 2022-09-21 RX ADMIN — ATORVASTATIN CALCIUM 20 MG: 20 TABLET, FILM COATED ORAL at 08:45

## 2022-09-21 RX ADMIN — ACETAMINOPHEN 650 MG: 325 TABLET, FILM COATED ORAL at 03:02

## 2022-09-21 RX ADMIN — APIXABAN 5 MG: 5 TABLET, FILM COATED ORAL at 08:44

## 2022-09-21 RX ADMIN — PANTOPRAZOLE SODIUM 40 MG: 40 TABLET, DELAYED RELEASE ORAL at 06:28

## 2022-09-21 RX ADMIN — BUMETANIDE 1 MG: 1 TABLET ORAL at 20:51

## 2022-09-21 RX ADMIN — SODIUM CHLORIDE 25 ML: 9 INJECTION, SOLUTION INTRAVENOUS at 08:53

## 2022-09-21 RX ADMIN — SODIUM CHLORIDE, PRESERVATIVE FREE 10 ML: 5 INJECTION INTRAVENOUS at 08:45

## 2022-09-21 RX ADMIN — SODIUM CHLORIDE, PRESERVATIVE FREE 10 ML: 5 INJECTION INTRAVENOUS at 20:32

## 2022-09-21 RX ADMIN — APIXABAN 5 MG: 5 TABLET, FILM COATED ORAL at 20:51

## 2022-09-21 RX ADMIN — BUDESONIDE 500 MCG: 0.5 SUSPENSION RESPIRATORY (INHALATION) at 20:26

## 2022-09-21 RX ADMIN — ACETAMINOPHEN 650 MG: 325 TABLET, FILM COATED ORAL at 08:45

## 2022-09-21 RX ADMIN — MAGNESIUM SULFATE HEPTAHYDRATE 2000 MG: 40 INJECTION, SOLUTION INTRAVENOUS at 08:54

## 2022-09-21 RX ADMIN — ACETAMINOPHEN 650 MG: 325 TABLET, FILM COATED ORAL at 16:25

## 2022-09-21 RX ADMIN — BUDESONIDE 500 MCG: 0.5 SUSPENSION RESPIRATORY (INHALATION) at 08:24

## 2022-09-21 ASSESSMENT — PAIN DESCRIPTION - FREQUENCY
FREQUENCY: CONTINUOUS
FREQUENCY: CONTINUOUS
FREQUENCY: INTERMITTENT
FREQUENCY: CONTINUOUS

## 2022-09-21 ASSESSMENT — PAIN SCALES - GENERAL
PAINLEVEL_OUTOF10: 3
PAINLEVEL_OUTOF10: 0
PAINLEVEL_OUTOF10: 0
PAINLEVEL_OUTOF10: 3
PAINLEVEL_OUTOF10: 3
PAINLEVEL_OUTOF10: 8
PAINLEVEL_OUTOF10: 0

## 2022-09-21 ASSESSMENT — PAIN DESCRIPTION - PAIN TYPE
TYPE: ACUTE PAIN

## 2022-09-21 ASSESSMENT — PAIN DESCRIPTION - ONSET
ONSET: ON-GOING
ONSET: PROGRESSIVE

## 2022-09-21 ASSESSMENT — PAIN DESCRIPTION - DESCRIPTORS
DESCRIPTORS: BURNING
DESCRIPTORS: ACHING
DESCRIPTORS: ACHING
DESCRIPTORS: BURNING

## 2022-09-21 ASSESSMENT — PAIN DESCRIPTION - LOCATION
LOCATION: HEAD
LOCATION: LEG
LOCATION: LEG
LOCATION: HEAD

## 2022-09-21 ASSESSMENT — PAIN - FUNCTIONAL ASSESSMENT
PAIN_FUNCTIONAL_ASSESSMENT: ACTIVITIES ARE NOT PREVENTED
PAIN_FUNCTIONAL_ASSESSMENT: ACTIVITIES ARE NOT PREVENTED
PAIN_FUNCTIONAL_ASSESSMENT: PREVENTS OR INTERFERES SOME ACTIVE ACTIVITIES AND ADLS
PAIN_FUNCTIONAL_ASSESSMENT: PREVENTS OR INTERFERES SOME ACTIVE ACTIVITIES AND ADLS

## 2022-09-21 ASSESSMENT — PAIN DESCRIPTION - ORIENTATION
ORIENTATION: LEFT;RIGHT
ORIENTATION: RIGHT
ORIENTATION: RIGHT
ORIENTATION: MID

## 2022-09-21 NOTE — PROGRESS NOTES
09/21/22 1325   Resting (Room Air)   SpO2 89   HR 92   Resting (On O2)   SpO2 95   HR 76   O2 Device Nasal cannula   O2 Flow Rate (l/min) 2 l/min   During Walk (Room Air)   SpO2 84   HR 98   Walk/Assistance Device Walker   Rate of Dyspnea 1   During Walk (On O2)   SpO2 91   HR 98   O2 Device Nasal cannula   O2 Flow Rate (l/min) 4 l/min   Walk/Assistance Device Walker   Rate of Dyspnea 1   After Walk   SpO2 94   O2 Device Nasal cannula   O2 Flow Rate (l/min) 3 l/min   Rate of Dyspnea 1   Does the Patient Qualify for Home O2 Yes   Liter Flow at Rest 2  (2L as needed.  Patient wears 2L at home PRN already)   Liter Flow on Exertion 4   Does the Patient Need Portable Oxygen Tanks Yes

## 2022-09-21 NOTE — PROGRESS NOTES
Internal Medicine Progress Note    Admit Date: 9/19/2022  Day: 2   Diet: ADULT DIET; Regular; Low Fat/Low Chol/High Fiber/2 gm Na    CC: Shortness of breath    Interval history: Patient was interviewed at the bedside this morning and was resting well using her CPAP. Patient does state overnight that she had increasing sharp pain in her right leg, specifically in the right calf. Patient also mentions that she has some left leg tenderness which is new since her admission. Patient remains on Eliquis 5 mg twice daily but we will still order a venous Doppler of the bilateral lower extremities to rule out DVT. Otherwise, patient continues to diurese on Lasix drip, she is down almost 3 L so far this admission. Patient's vitals appreciated-stable  Patient's daily labs appreciated largely stable with a slight drop in hemoglobin to 10.2. We will continue to monitor. Telemetry appreciated-normal sinus rhythm overnight with some PVCs. Will attempt to transition the patient back to home dose of Bumex and stop the Lasix drip at this time. HPI:  Kiersten Medina is a 58 y.o. female with medical history of type 2 diabetes, hypertension, obesity, MARIS versus OHS, previous PE on eliquis, diastolic congestive heart failure with an ejection fraction of 55 to 60% on echocardiogram from 2015 and COPD who presents with complaints of increasing shortness of breath over the last several days. Patient states that the shortness of breath is worse with activity and exertion. Patient states that she is unable to walk up a flight of stairs without becoming winded. Patient states that she does have inhalers at home for COPD treatment but has not had to use them more frequently than normal over the last several days. Patient states that she is unable to sleep lying down flat as she is short of breath. States that she must sleep upright using 3-4 pillows to achieve this.   Patient also states that she wakes up in the middle of the night coughing and short of breath. Patient states that her weight is up recently between 10 to 20 pounds. States as well that she has noticed that her bilateral lower extremities have been significantly more swollen than normal.  Patient states that recently she has noticed an increase in sputum expectoration. States that the sputum is generally white in color. Otherwise denies nausea, vomiting, diarrhea, chest pain, palpitations, night sweats, fevers, or recent sick contacts. Patient states that she has generally been very compliant with her medication regimen. States that she does not miss doses of her Bumex treatment. Also states that she tries to adhere to a low-sodium diet. There are no open changes to her recent diet over the last several days coinciding with the onset of her symptoms. States that she uses a CPAP to sleep at home and has been compliant with that treatment. It is unclear how compliant the patient has been as she ostensibly mentions to ED staff that she had not been compliant. When speaking to the admitting team however, she states that she has been completely compliant with her medications as they have been prescribed to her by her providers. Patient states that she is not on at home oxygen at baseline for her COPD. ED course: In the ED, patient was noted to have an oxygen saturation in the 80s which necessitated starting her on oxygen therapy. When patient was seen in the ED, she was on 1 L of oxygen via nasal cannula and she was saturating at 95%. When she presented to the ED, vital signs were appreciated and blood pressure was noted to be 170/75. Respirations were noted to be at 22. Patient was afebrile with a heart rate of 95. Patient had an EKG in the ED which showed that she was in normal sinus rhythm with a right bundle branch block and some inversion of T waves in leads V1 and V3. Otherwise no evidence of ischemic changes.   Patient had a chest x-ray which showed stable cardiomegaly as well as pulmonary vascular congestion. Labs in the ED notable for proBNP of around 1300. CBC notable only for slight anemia at 11 with an MCV of 79.3 and a mean corpuscular hemoglobin concentration of 25.8. CMP within normal limits. Troponin is negative at 0.01. Magnesium slightly low at 1.6     The patient was discussed with the admitting hospitalist who believes that the patient would benefit from an inpatient admission for management of likely COPD exacerbation superimposed on diastolic heart failure exacerbation. The course of treatment with inpatient hospitalization and expected medication regimen was discussed with the patient who expressed understanding and agreement with the course of treatment. All of the patient's questions were answered in the ED.   Medications:     Scheduled Meds:   bumetanide  1 mg Oral BID    magnesium sulfate  2,000 mg IntraVENous Once    predniSONE  40 mg Oral Daily    senna  1 tablet Oral Nightly    budesonide  0.5 mg Nebulization BID    [START ON 9/23/2022] Dulaglutide  4.5 mg SubCUTAneous Weekly    apixaban  5 mg Oral BID    sodium chloride flush  5-40 mL IntraVENous 2 times per day    pantoprazole  40 mg Oral QAM AC    atorvastatin  20 mg Oral Daily     Continuous Infusions:   sodium chloride 25 mL (09/21/22 0853)     PRN Meds:sodium chloride flush, sodium chloride, ondansetron **OR** ondansetron, polyethylene glycol, acetaminophen **OR** acetaminophen, perflutren lipid microspheres    Objective:   Vitals:   T-max:  Patient Vitals for the past 8 hrs:   BP Temp Temp src Pulse Resp SpO2 Weight   09/21/22 0840 104/68 97.8 °F (36.6 °C) Oral 73 19 97 % --   09/21/22 0827 -- -- -- 74 18 98 % --   09/21/22 0824 -- -- -- 74 18 98 % --   09/21/22 0600 -- -- -- -- -- -- (!) 346 lb 9 oz (157.2 kg)   09/21/22 0458 -- -- -- 78 14 97 % --   09/21/22 0301 92/62 98.1 °F (36.7 °C) Oral 71 16 97 % --   09/21/22 0200 -- -- -- -- -- 98 % -- Intake/Output Summary (Last 24 hours) at 2022 0859  Last data filed at 2022 1862  Gross per 24 hour   Intake 1680 ml   Output 3500 ml   Net -1820 ml         Physical Exam  Constitutional:       General: She is awake. Appearance: She is morbidly obese. Eyes:      Extraocular Movements: Extraocular movements intact. Cardiovascular:      Rate and Rhythm: Normal rate and regular rhythm. Pulses:           Radial pulses are 2+ on the right side and 2+ on the left side. Heart sounds: Normal heart sounds, S1 normal and S2 normal. Heart sounds not distant. Comments: Painful bilateral lower extremities to palpation  Abdominal:      General: Abdomen is protuberant. Palpations: Abdomen is soft. Tenderness: There is abdominal tenderness in the right upper quadrant. Musculoskeletal:      Right lower le+ Pitting Edema present. Left lower le+ Pitting Edema present. Neurological:      Mental Status: She is alert and oriented to person, place, and time. Psychiatric:         Behavior: Behavior is cooperative. LABS:    CBC:   Recent Labs     22  0510 22  0444   WBC 5.1 5.0 5.6   HGB 11.0* 11.2* 10.3*   HCT 33.8* 35.3* 31.7*    253 241   MCV 79.3* 81.7 79.7*       Renal:    Recent Labs     22  0510 22  0445    143 139 142   K 3.5 4.4 4.2 3.6   CL 99 99 93* 94*   CO2 30 34* 35* 41*   BUN 10 9 12 11   CREATININE 0.5* 0.5* 0.7 0.6   GLUCOSE 161* 129* 180* 121*   CALCIUM 9.1 8.8 8.9 8.8   MG 1.60* 1.90  --  1.80   PHOS  --   --  2.8  --    ANIONGAP 11 10 11 7       Hepatic:   Recent Labs     226 22   AST 20  --    ALT 22  --    BILITOT 0.6  --    PROT 6.5  --    LABALBU 3.7 4.0   ALKPHOS 114  --        Troponin:   Recent Labs     22   TROPONINI <0.01       BNP: No results for input(s): BNP in the last 72 hours.   Lipids: No results for input(s): CHOL, HDL in the last 72 hours. Invalid input(s): LDLCALCU, TRIGLYCERIDE  ABGs:  No results for input(s): PHART, MAR0BAK, PO2ART, HFE7QCH, BEART, THGBART, L2BZXWPI, IYD0FXQ in the last 72 hours. INR: No results for input(s): INR in the last 72 hours. Lactate: No results for input(s): LACTATE in the last 72 hours. Cultures:  -----------------------------------------------------------------  RAD:   XR CHEST (2 VW)   Final Result   Impression: Stable cardiomegaly. Mild pulmonary vascular congestion. VL Extremity Venous Bilateral    (Results Pending)         Assessment/Plan:   Harjeet Tobar is a 58 y.o. female with a past medical history of type 2 diabetes, hypertension, obesity, MARIS versus OHS, previous PE on apixaban, diastolic congestive heart failure with an ejection fraction of 55 to 60% on echocardiogram from 2015, and COPD who presents with increasing shortness of breath and fatigue on exertion over the last several days. COPD Exacerbation: patient states that she has had increased sputum production and increased shortness of breath over the last several days. Increased sputum expectoration since symptom onset. VBG notable for a CO2 of 66. Albuterol  Duoneb treatments  Azithromycin  Prednisone 40  Diastolic HF Exacerbation: Patient states that she has been having orthopnea and PND at home. Increasing bilateral lower extremity edema. Pro BNP ~1400. Bumex home dose.  Stop Laxis gtt  Strict Is and Os  Weight measurements  Currently requiring 2L via nasal canula  Echocardiogram while inpatient as most recent was in 2015  Pharmacy consult for medication reconciliation  DM2-   Will have the patient on a sliding scale  Add basal insulin as needed  Have patient on a low-carb diet while inpatient  MARIS vs OHS  Continue at home CPAP  Hypertension  Hydralazine 10 PRN to maintain systolic BP below 429  Electrolyte abnormalities  Will monitor daily with BMP, Mag  Will replete as needed      Code Status:Full Code  FEN: ADULT DIET;  Regular; Low Fat/Low Chol/High Fiber/2 gm Na  PPX:  Eliquis, Protonix qd  DISPO: ODILIA Orellana MD  PGY1, Internal Medicine  09/21/22  8:59 AM      This patient will be staffed and discussed with Aviva Saunders MD.

## 2022-09-21 NOTE — PLAN OF CARE
No falls noted thus far this shift, bed in lowest position, alarm on, non-skid socks on, call light within reach, hourly checks, safety maintained, will continue to monitor. Problem: Safety - Adult  Goal: Free from fall injury  9/21/2022 0310 by Palma Ngo RN  Outcome: Progressing    Pt has had intermittent headaches throughout the shift, PRN Tylenol given with positive effect. Problem: Pain  Goal: Verbalizes/displays adequate comfort level or baseline comfort level  Outcome: Progressing     Pt has remained on bipap continuously except for short breaks for water, food, and taking meds.     Problem: Respiratory - Adult  Goal: Achieves optimal ventilation and oxygenation  9/21/2022 0310 by Palma Ngo RN  Outcome: Progressing

## 2022-09-21 NOTE — PLAN OF CARE
Problem: Pain  Goal: Verbalizes/displays adequate comfort level or baseline comfort level  9/21/2022 1634 by Dayanara Yeager RN  Outcome: Progressing  Flowsheets (Taken 9/21/2022 1634)  Verbalizes/displays adequate comfort level or baseline comfort level:   Encourage patient to monitor pain and request assistance   Administer analgesics based on type and severity of pain and evaluate response   Consider cultural and social influences on pain and pain management   Assess pain using appropriate pain scale   Implement non-pharmacological measures as appropriate and evaluate response   Notify Licensed Independent Practitioner if interventions unsuccessful or patient reports new pain  Note: Pt c/o headache, PRN tylenol given and heat pack applied     Problem: Respiratory - Adult  Goal: Achieves optimal ventilation and oxygenation  9/21/2022 1634 by Dayanara Yeager RN  Outcome: Progressing  Flowsheets (Taken 9/21/2022 1634)  Achieves optimal ventilation and oxygenation:   Assess for changes in respiratory status   Position to facilitate oxygenation and minimize respiratory effort   Assess for changes in mentation and behavior   Oxygen supplementation based on oxygen saturation or arterial blood gases   Assess the need for suctioning and aspirate as needed   Respiratory therapy support as indicated  Note: Pt on 4L O2 during the day, pt up in chair, denies SOB     Problem: Metabolic/Fluid and Electrolytes - Adult  Goal: Electrolytes maintained within normal limits  Outcome: Progressing  Flowsheets (Taken 9/21/2022 1634)  Electrolytes maintained within normal limits:   Monitor labs and assess patient for signs and symptoms of electrolyte imbalances   Monitor response to electrolyte replacements, including repeat lab results as appropriate   Administer electrolyte replacement as ordered  Note: Pt given IV mag during shift.

## 2022-09-21 NOTE — CARE COORDINATION
Pt is visiting sister here in [place], but lives in [place] with her son and dil. Pt has cpap at night and rollator and cane. Plan is to return to sisters and then back to [place] when medically stable. Pt is currently on 4 L O2 none at home. Home O2 eval completed and will need O2 at discharge.      Melissa Holden RN, BSN, [employer]  Case Management Department  [phone]

## 2022-09-22 VITALS
HEIGHT: 63 IN | SYSTOLIC BLOOD PRESSURE: 113 MMHG | TEMPERATURE: 98.1 F | WEIGHT: 293 LBS | RESPIRATION RATE: 20 BRPM | BODY MASS INDEX: 51.91 KG/M2 | OXYGEN SATURATION: 94 % | DIASTOLIC BLOOD PRESSURE: 72 MMHG | HEART RATE: 68 BPM

## 2022-09-22 LAB
ANION GAP SERPL CALCULATED.3IONS-SCNC: 3 MMOL/L (ref 3–16)
BASOPHILS ABSOLUTE: 0 K/UL (ref 0–0.2)
BASOPHILS RELATIVE PERCENT: 0.3 %
BUN BLDV-MCNC: 13 MG/DL (ref 7–20)
CALCIUM SERPL-MCNC: 9.1 MG/DL (ref 8.3–10.6)
CHLORIDE BLD-SCNC: 95 MMOL/L (ref 99–110)
CO2: 41 MMOL/L (ref 21–32)
CREAT SERPL-MCNC: <0.5 MG/DL (ref 0.6–1.2)
EOSINOPHILS ABSOLUTE: 0 K/UL (ref 0–0.6)
EOSINOPHILS RELATIVE PERCENT: 0.2 %
GFR AFRICAN AMERICAN: >60
GFR NON-AFRICAN AMERICAN: >60
GLUCOSE BLD-MCNC: 142 MG/DL (ref 70–99)
HCT VFR BLD CALC: 32.6 % (ref 36–48)
HEMOGLOBIN: 10.4 G/DL (ref 12–16)
LYMPHOCYTES ABSOLUTE: 1.1 K/UL (ref 1–5.1)
LYMPHOCYTES RELATIVE PERCENT: 19.7 %
MAGNESIUM: 1.9 MG/DL (ref 1.8–2.4)
MCH RBC QN AUTO: 25.5 PG (ref 26–34)
MCHC RBC AUTO-ENTMCNC: 31.8 G/DL (ref 31–36)
MCV RBC AUTO: 80.3 FL (ref 80–100)
MONOCYTES ABSOLUTE: 0.4 K/UL (ref 0–1.3)
MONOCYTES RELATIVE PERCENT: 7 %
NEUTROPHILS ABSOLUTE: 4 K/UL (ref 1.7–7.7)
NEUTROPHILS RELATIVE PERCENT: 72.8 %
PDW BLD-RTO: 17 % (ref 12.4–15.4)
PLATELET # BLD: 241 K/UL (ref 135–450)
PMV BLD AUTO: 8.3 FL (ref 5–10.5)
POTASSIUM REFLEX MAGNESIUM: 3.7 MMOL/L (ref 3.5–5.1)
RBC # BLD: 4.06 M/UL (ref 4–5.2)
SODIUM BLD-SCNC: 139 MMOL/L (ref 136–145)
WBC # BLD: 5.5 K/UL (ref 4–11)

## 2022-09-22 PROCEDURE — 80048 BASIC METABOLIC PNL TOTAL CA: CPT

## 2022-09-22 PROCEDURE — 6370000000 HC RX 637 (ALT 250 FOR IP)

## 2022-09-22 PROCEDURE — 85025 COMPLETE CBC W/AUTO DIFF WBC: CPT

## 2022-09-22 PROCEDURE — 2580000003 HC RX 258

## 2022-09-22 PROCEDURE — 94761 N-INVAS EAR/PLS OXIMETRY MLT: CPT

## 2022-09-22 PROCEDURE — 94640 AIRWAY INHALATION TREATMENT: CPT

## 2022-09-22 PROCEDURE — 6360000002 HC RX W HCPCS

## 2022-09-22 PROCEDURE — 94660 CPAP INITIATION&MGMT: CPT

## 2022-09-22 PROCEDURE — 36415 COLL VENOUS BLD VENIPUNCTURE: CPT

## 2022-09-22 PROCEDURE — 97530 THERAPEUTIC ACTIVITIES: CPT

## 2022-09-22 PROCEDURE — 6370000000 HC RX 637 (ALT 250 FOR IP): Performed by: INTERNAL MEDICINE

## 2022-09-22 PROCEDURE — 83735 ASSAY OF MAGNESIUM: CPT

## 2022-09-22 PROCEDURE — 2700000000 HC OXYGEN THERAPY PER DAY

## 2022-09-22 RX ORDER — MAGNESIUM SULFATE 1 G/100ML
1000 INJECTION INTRAVENOUS ONCE
Status: COMPLETED | OUTPATIENT
Start: 2022-09-22 | End: 2022-09-22

## 2022-09-22 RX ORDER — PREDNISONE 20 MG/1
40 TABLET ORAL DAILY
Qty: 2 TABLET | Refills: 0 | Status: SHIPPED | OUTPATIENT
Start: 2022-09-23 | End: 2022-09-24

## 2022-09-22 RX ORDER — PANTOPRAZOLE SODIUM 40 MG/1
40 TABLET, DELAYED RELEASE ORAL
Qty: 30 TABLET | Refills: 3 | Status: SHIPPED | OUTPATIENT
Start: 2022-09-23

## 2022-09-22 RX ORDER — BUMETANIDE 1 MG/1
1 TABLET ORAL 2 TIMES DAILY
Qty: 30 TABLET | Refills: 3 | Status: SHIPPED | OUTPATIENT
Start: 2022-09-22

## 2022-09-22 RX ORDER — DULAGLUTIDE 4.5 MG/.5ML
4.5 INJECTION, SOLUTION SUBCUTANEOUS WEEKLY
Qty: 1 ADJUSTABLE DOSE PRE-FILLED PEN SYRINGE | Refills: 0 | Status: SHIPPED | OUTPATIENT
Start: 2022-09-22

## 2022-09-22 RX ADMIN — BUDESONIDE 500 MCG: 0.5 SUSPENSION RESPIRATORY (INHALATION) at 08:23

## 2022-09-22 RX ADMIN — SODIUM CHLORIDE, PRESERVATIVE FREE 10 ML: 5 INJECTION INTRAVENOUS at 09:01

## 2022-09-22 RX ADMIN — PREDNISONE 40 MG: 20 TABLET ORAL at 08:50

## 2022-09-22 RX ADMIN — MAGNESIUM SULFATE HEPTAHYDRATE 1000 MG: 1 INJECTION, SOLUTION INTRAVENOUS at 09:04

## 2022-09-22 RX ADMIN — APIXABAN 5 MG: 5 TABLET, FILM COATED ORAL at 08:53

## 2022-09-22 RX ADMIN — PANTOPRAZOLE SODIUM 40 MG: 40 TABLET, DELAYED RELEASE ORAL at 06:57

## 2022-09-22 RX ADMIN — SODIUM CHLORIDE 25 ML: 9 INJECTION, SOLUTION INTRAVENOUS at 09:03

## 2022-09-22 RX ADMIN — ATORVASTATIN CALCIUM 20 MG: 20 TABLET, FILM COATED ORAL at 08:51

## 2022-09-22 RX ADMIN — BUMETANIDE 1 MG: 1 TABLET ORAL at 08:51

## 2022-09-22 ASSESSMENT — PAIN SCALES - GENERAL: PAINLEVEL_OUTOF10: 0

## 2022-09-22 NOTE — PROGRESS NOTES
training; Endurance training; Safety education & training;Patient/Caregiver education & training; Therapeutic activities; Return to work related activity; Neuromuscular re-education     Restrictions  Position Activity Restriction  Other position/activity restrictions: up with assist     Subjective    Subjective  Subjective: pt supine in bed and agreeable to PT, requesting to get up to chair  Pain: denies pain and reports breathing is much better  Orientation  Overall Orientation Status: Within Normal Limits  Orientation Level: Oriented X4  Cognition  Overall Cognitive Status: WFL     Objective   Vitals     Bed Mobility Training  Bed Mobility Training: Yes  Supine to Sit: Stand-by assistance; Additional time  Scooting: Stand-by assistance; Additional time  Balance  Sitting: Intact (supervision EOB)  Standing: Impaired  Standing - Static: Constant support; Fair (SBA at [address] [place] [address])  Standing - Dynamic: Constant support; Fair  Transfer Training  Transfer Training: Yes  Sit to Stand: Stand-by assistance; Additional time (from EOB at [address] [place] [address])  Stand to Sit: Stand-by assistance; Additional time  Bed to Chair: Contact-guard assistance; Additional time (from EOB to chair with RW)  Gait Training  Gait Training: Yes  Gait  Overall Level of Assistance: Contact-guard assistance  Base of Support: Widened  Speed/Jayashree: Slow;Shuffled  Step Length: Left shortened;Right shortened  Gait Abnormalities: Step to gait; Shuffling gait  Distance (ft): 2 Feet (few steps to chair- unable to tolerate further due to dizziness)  Assistive Device: Walker, rolling;Gait belt (3Lo2)           Safety Devices  Type of Devices: Call light within reach; Left in chair;Nurse notified; Chair alarm in place;Gait belt       Goals  Short Term Goals  Time Frame for Short term goals: by dc  Short term goal 1: pt will perform bed mobility with mod I  Short term goal 2: pt will perform functional transfers with LRAD and mod I  Short term goal 3: pt will ambulate 48' with LRAD and mod I  Patient Goals   Patient goals : to go home    Education  Patient Education  Education Given To: Patient  Education Provided: Role of Therapy;Plan of Care  Education Method: Demonstration;Verbal  Barriers to Learning: None  Education Outcome: Verbalized understanding    Therapy Time   Individual Concurrent Group Co-treatment   Time In [address]         Time Out 0928         Minutes 38               Silva Brandon, PT

## 2022-09-22 NOTE — PROGRESS NOTES
Discharge note: Patient has been seen by doctor. Discharge order obtained, and discharge instructions reviewed. Patient educated, using the teach back method, about follow up instructions and discharge instructions. A completed copy of the AVS instructions given to patient and all questions answered. IV catheter removed without complaints, catheter intact, site WNL. Discharged to [facility] via by PCA with all documented belongings, O2 tank and Meds to beds.     Current Discharge Medication List        START taking these medications    Details   pantoprazole (PROTONIX) 40 MG tablet Take 1 tablet by mouth every morning (before breakfast)  Qty: 30 tablet, Refills: 3      predniSONE (DELTASONE) 20 MG tablet Take 2 tablets by mouth daily for 1 dose  Qty: 2 tablet, Refills: 0

## 2022-09-22 NOTE — CARE COORDINATION
Case Management Assessment            Discharge Note                    Date / Time of Note: 9/22/2022 11:26 AM                  Discharge Note Completed by: Saurabh Prasad RN    Patient Name: Austyn Hernandez   YOB: 1960  Diagnosis: COPD exacerbation ([facility] [J44.1]  Acute congestive heart failure, unspecified heart failure type [facility]) [I50.9]   Date / Time: 9/19/2022 11:37 AM    Current PCP: Referring Not In System (Inactive)  Clinic patient: No    Hospitalization in the last 30 days: No    Advance Directives:  Code Status: Full Code  [place] DNR form completed and on chart: No, Not Indicated    Financial:  Payor: Alis Blackwell / Plan: Caleb Hardy / Product Type: *No Product type* /      Pharmacy:    [address]  Phone: [phone] Fax: [phone]    Margie Dobbs [place] [phone] - [phone]  [address]  [address] [place] [address]  Phone: [phone] Fax: [phone]      Assistance purchasing medications?: Potential Assistance Purchasing Medications: No  Assistance provided by Case Management: None at this time    Does patient want to participate in local refill/ meds to beds program?: Yes    Meds To Beds General Rules:  1. Can ONLY be done Monday- Friday between 8:30am-5pm  2. Prescription(s) must be in pharmacy by 3pm to be filled same day  3. Copy of patient's insurance/ prescription drug card and patient face sheet must be sent along with the prescription(s)  4. Cost of Rx cannot be added to hospital bill. If financial assistance is needed, please contact unit  or ;  or  CANNOT provide pharmacy voucher for patients co-pays  5.  Patients can then  the prescription on their way out of the hospital at discharge, or pharmacy can deliver to the bedside if staff is available. (payment due at time of pick-up or delivery - cash, check, or card accepted)     Able to afford home medications/ co-pay costs: Yes    ADLS:  Current PT AM-PAC Score: 18 /24  Current OT AM-PAC Score: 18 /24      DISCHARGE Disposition: Home- No Services Needed    LOC at discharge: Not Applicable  HEMALATHA Completed: No, Not Indicated    Notification completed in HENS/PAS?:  Not Applicable    IMM Completed:   Yes, Case management has presented and reviewed IMM letter #2 to the patient and/or family/ POA. Patient and/or family/POA verbalized understanding of their medicare rights and appeal process if needed. Patient and/or family/POA has signed, initialed and placed today's date (09.22.22) and time (1107) on IMM letter #2 on the the appropriate lines. Patient and/or family/POA, copy of letter offered and they are aware that this original copy of IMM letter #2 is available prior to discharge from the paper chart on the unit. Electronic documentation has been entered into epic for IMM letter #2 and original paper copy has been added to the paper chart at the nurses station.      Transportation:  Transportation PLAN for discharge: family   Mode of Transport: Private Car  Reason for medical transport: Not Applicable  Name of [address] [place] [address],[address]: Not Applicable  Time of Transport:     Transport form completed: No, Not Indicated    Home Care:  [address] ordered at discharge: No, Not [address]: Not Applicable  Orders faxed: No    Durable Medical Equipment:  DME Provider: has prior to admission  Equipment obtained during hospitalization:     Home Oxygen and Respiratory Equipment:  Oxygen needed at discharge?: Yes  [facility]: [facility]  Phone: [phone]   Portable tank available for discharge?: Yes    Dialysis:  Dialysis patient: No    Dialysis Center:  Not Applicable    Hospice Services:  Location: Not Applicable  Agency: Not Applicable    Consents signed: No, Not Indicated    Referrals made at [facility] for outpatient continued care:  Not Applicable    Additional CM Notes: Patient qualifies for home O2 at DC, will be DC'd to her sisters home at [place] and plan eventually to return home to [facility] with son and DIL. Patient needs home O2 at DC, [facility] following and working on home set up of home oxygen at [place]. Family to transport at [place]. The Plan for Transition of Care is related to the following treatment goals of COPD exacerbation ([facility] [J44.1]  Acute congestive heart failure, unspecified heart failure type ([facility] [I50.9]    The Patient and/or patient representative Darion Razo and her family were provided with a choice of provider and agrees with the discharge plan Yes    Freedom of choice list was provided with basic dialogue that supports the patient's individualized plan of care/goals and shares the quality data associated with the providers.  Yes    Care Transitions patient: No    Marilee Banegas RN  [facility] [employer]  Case Management Department  Ph: [phone]  Fax: [phone]

## 2022-09-22 NOTE — PROGRESS NOTES
Internal Medicine Progress Note    Admit Date: 9/19/2022  Day: 3   Diet: ADULT DIET; Regular; Low Fat/Low Chol/High Fiber/2 gm Na    CC: Shortness of breath    Interval history: Patient was interviewed at the bedside this morning was resting well. Patient was able to tolerate her bilateral lower extremity venous Dopplers yesterday. They did not show any evidence of DVT. In the afternoon, the patient had an 8 out of 10 headache for which she was given Tylenol. The headache improved with the Tylenol. Patient was also switched yesterday to her at home dose of p.o. Bumex. Her Lasix drip was discontinued. Patient continues to diurese well having an additional 2 L of diuresis yesterday afternoon after resuming the Bumex. Patient has no new acute complaints at this time. Spoke to the patient that she could likely fly on Saturday if she feels up to it given that she is currently on Eliquis 5 mg twice daily and that, per yesterday's bilateral lower extremity venous Doppler, there is no current evidence of DVT. Blayne Fortune HPI:  Harjeet Tobar is a 58 y.o. female with medical history of type 2 diabetes, hypertension, obesity, MARIS versus OHS, previous PE on eliquis, diastolic congestive heart failure with an ejection fraction of 55 to 60% on echocardiogram from 2015 and COPD who presents with complaints of increasing shortness of breath over the last several days. Patient states that the shortness of breath is worse with activity and exertion. Patient states that she is unable to walk up a flight of stairs without becoming winded. Patient states that she does have inhalers at home for COPD treatment but has not had to use them more frequently than normal over the last several days. Patient states that she is unable to sleep lying down flat as she is short of breath. States that she must sleep upright using 3-4 pillows to achieve this.   Patient also states that she wakes up in the middle of the night coughing and short of breath. Patient states that her weight is up recently between 10 to 20 pounds. States as well that she has noticed that her bilateral lower extremities have been significantly more swollen than normal.  Patient states that recently she has noticed an increase in sputum expectoration. States that the sputum is generally white in color. Otherwise denies nausea, vomiting, diarrhea, chest pain, palpitations, night sweats, fevers, or recent sick contacts. Patient states that she has generally been very compliant with her medication regimen. States that she does not miss doses of her Bumex treatment. Also states that she tries to adhere to a low-sodium diet. There are no open changes to her recent diet over the last several days coinciding with the onset of her symptoms. States that she uses a CPAP to sleep at home and has been compliant with that treatment. It is unclear how compliant the patient has been as she ostensibly mentions to ED staff that she had not been compliant. When speaking to the admitting team however, she states that she has been completely compliant with her medications as they have been prescribed to her by her providers. Patient states that she is not on at home oxygen at baseline for her COPD. ED course: In the ED, patient was noted to have an oxygen saturation in the 80s which necessitated starting her on oxygen therapy. When patient was seen in the ED, she was on 1 L of oxygen via nasal cannula and she was saturating at 95%. When she presented to the ED, vital signs were appreciated and blood pressure was noted to be 170/75. Respirations were noted to be at 22. Patient was afebrile with a heart rate of 95. Patient had an EKG in the ED which showed that she was in normal sinus rhythm with a right bundle branch block and some inversion of T waves in leads V1 and V3. Otherwise no evidence of ischemic changes.   Patient had a chest x-ray which showed stable cardiomegaly as well as pulmonary vascular congestion. Labs in the ED notable for proBNP of around 1300. CBC notable only for slight anemia at 11 with an MCV of 79.3 and a mean corpuscular hemoglobin concentration of 25.8. CMP within normal limits. Troponin is negative at 0.01. Magnesium slightly low at 1.6     The patient was discussed with the admitting hospitalist who believes that the patient would benefit from an inpatient admission for management of likely COPD exacerbation superimposed on diastolic heart failure exacerbation. The course of treatment with inpatient hospitalization and expected medication regimen was discussed with the patient who expressed understanding and agreement with the course of treatment. All of the patient's questions were answered in the ED.   Medications:     Scheduled Meds:   magnesium sulfate  1,000 mg IntraVENous Once    bumetanide  1 mg Oral BID    predniSONE  40 mg Oral Daily    senna  1 tablet Oral Nightly    budesonide  0.5 mg Nebulization BID    [START ON 9/23/2022] Dulaglutide  4.5 mg SubCUTAneous Weekly    apixaban  5 mg Oral BID    sodium chloride flush  5-40 mL IntraVENous 2 times per day    pantoprazole  40 mg Oral QAM AC    atorvastatin  20 mg Oral Daily     Continuous Infusions:   sodium chloride 25 mL (09/21/22 0853)     PRN Meds:acetaminophen **OR** acetaminophen, sodium chloride flush, sodium chloride, ondansetron **OR** ondansetron, polyethylene glycol, perflutren lipid microspheres    Objective:   Vitals:   T-max:  Patient Vitals for the past 8 hrs:   BP Temp Temp src Pulse Resp SpO2 Weight   09/22/22 0823 -- -- -- 65 18 100 % --   09/22/22 0600 -- -- -- -- -- 100 % --   09/22/22 0506 -- -- -- -- 19 -- --   09/22/22 0500 115/68 97.9 °F (36.6 °C) Axillary 60 20 100 % (!) 345 lb 7.4 oz (156.7 kg)   09/22/22 0400 -- -- -- -- -- 100 % --   09/22/22 0200 -- -- -- -- -- 98 % --         Intake/Output Summary (Last 24 hours) at 9/22/2022 0845  Last data filed at 2022 0459  Gross per 24 hour   Intake 840 ml   Output 2175 ml   Net -1335 ml         Physical Exam  Constitutional:       General: She is awake. Appearance: She is morbidly obese. Eyes:      Extraocular Movements: Extraocular movements intact. Cardiovascular:      Rate and Rhythm: Normal rate and regular rhythm. Pulses:           Radial pulses are 2+ on the right side and 2+ on the left side. Heart sounds: Normal heart sounds, S1 normal and S2 normal. Heart sounds not distant. Comments: Painful bilateral lower extremities to palpation  Abdominal:      General: Abdomen is protuberant. Palpations: Abdomen is soft. Tenderness: There is abdominal tenderness in the right upper quadrant. Musculoskeletal:      Right lower le+ Pitting Edema present. Left lower le+ Pitting Edema present. Neurological:      Mental Status: She is alert and oriented to person, place, and time. Psychiatric:         Behavior: Behavior is cooperative. LABS:    CBC:   Recent Labs     22  0510 22  0444 22  0402   WBC 5.0 5.6 5.5   HGB 11.2* 10.3* 10.4*   HCT 35.3* 31.7* 32.6*    241 241   MCV 81.7 79.7* 80.3       Renal:    Recent Labs     22  0510 22  0445 22  0402    139 142 139   K 4.4 4.2 3.6 3.7   CL 99 93* 94* 95*   CO2 34* 35* 41* 41*   BUN 9 12 11 13   CREATININE 0.5* 0.7 0.6 <0.5*   GLUCOSE 129* 180* 121* 142*   CALCIUM 8.8 8.9 8.8 9.1   MG 1.90  --  1.80 1.90   PHOS  --  2.8  --   --    ANIONGAP 10 11 7 3       Hepatic:   Recent Labs     22  1226 22   AST 20  --    ALT 22  --    BILITOT 0.6  --    PROT 6.5  --    LABALBU 3.7 4.0   ALKPHOS 114  --        Troponin:   Recent Labs     22   TROPONINI <0.01       BNP: No results for input(s): BNP in the last 72 hours. Lipids: No results for input(s): CHOL, HDL in the last 72 hours.     Invalid input(s): LDLCALCU, TRIGLYCERIDE  ABGs:  No results for input(s): PHART, DXQ3BBA, PO2ART, RFI0EYH, BEART, THGBART, D0TAJEHM, ZJX7RBR in the last 72 hours. INR: No results for input(s): INR in the last 72 hours. Lactate: No results for input(s): LACTATE in the last 72 hours. Cultures:  -----------------------------------------------------------------  RAD:   VL Extremity Venous Bilateral   Final Result      XR CHEST (2 VW)   Final Result   Impression: Stable cardiomegaly. Mild pulmonary vascular congestion. Assessment/Plan:   Nghia Giraldo is a 58 y.o. female with a past medical history of type 2 diabetes, hypertension, obesity, MARIS versus OHS, previous PE on apixaban, diastolic congestive heart failure with an ejection fraction of 55 to 60% on echocardiogram from 2015, and COPD who presents with increasing shortness of breath and fatigue on exertion over the last several days. COPD Exacerbation: patient states that she has had increased sputum production and increased shortness of breath over the last several days. Increased sputum expectoration since symptom onset. VBG notable for a CO2 of 66. Albuterol  Duoneb treatments  Azithromycin  Prednisone 40  Diastolic HF Exacerbation: Patient states that she has been having orthopnea and PND at home. Increasing bilateral lower extremity edema. Pro BNP ~1400. Bumex home dose. Stop Laxis gtt  Patient currently down 4.2 L as admission. Strict Is and Os  Weight measurements  Currently requiring 2L via nasal canula  Echocardiogram while inpatient as most recent was in 2015  Pharmacy consult for medication reconciliation  DM2-   Will have the patient on a sliding scale  Add basal insulin as needed  Have patient on a low-carb diet while inpatient  MARIS vs OHS  Continue at home CPAP  Hypertension  Hydralazine 10 PRN to maintain systolic BP below 495  Electrolyte abnormalities  Will monitor daily with BMP, Mag  Will replete as needed      Code Status:Full Code  FEN: ADULT DIET; Regular;  Low Fat/Low Chol/High Fiber/2 gm Na  PPX:  Eliquis, Protonix qd  DISPO: GMF    Lianne Rushing MD  PGY1, Internal Medicine  09/22/22  8:45 AM      This patient will be staffed and discussed with Vaughn Mena MD.

## 2022-09-22 NOTE — PLAN OF CARE
Problem: Safety - Adult  Goal: Free from fall injury  Outcome: Progressing  Flowsheets (Taken 9/20/2022 3675 by Aldo Enriquez RN)  Free From Fall Injury: Instruct family/caregiver on patient safety     Problem: Discharge Planning  Goal: Discharge to home or other facility with appropriate resources  Outcome: Progressing  Flowsheets (Taken 9/22/2022 5841)  Discharge to home or other facility with appropriate resources:   Identify barriers to discharge with patient and caregiver   Arrange for needed discharge resources and transportation as appropriate   Identify discharge learning needs (meds, wound care, etc)   Refer to discharge planning if patient needs post-hospital services based on physician order or complex needs related to functional status, cognitive ability or social support system     Problem: Pain  Goal: Verbalizes/displays adequate comfort level or baseline comfort level  9/22/2022 0611 by Carlos Rivera RN  Outcome: Progressing  Flowsheets (Taken 9/22/2022 8653)  Verbalizes/displays adequate comfort level or baseline comfort level:   Encourage patient to monitor pain and request assistance   Assess pain using appropriate pain scale     Problem: Chronic Conditions and Co-morbidities  Goal: Patient's chronic conditions and co-morbidity symptoms are monitored and maintained or improved  Outcome: Progressing  Flowsheets (Taken 9/22/2022 0611)  Care Plan - Patient's Chronic Conditions and Co-Morbidity Symptoms are Monitored and Maintained or Improved:   Monitor and assess patient's chronic conditions and comorbid symptoms for stability, deterioration, or improvement   Collaborate with multidisciplinary team to address chronic and comorbid conditions and prevent exacerbation or deterioration   Update acute care plan with appropriate goals if chronic or comorbid symptoms are exacerbated and prevent overall improvement and discharge     Problem: Respiratory - Adult  Goal: Achieves optimal ventilation and oxygenation  9/22/2022 0611 by Komal Maloney RN  Outcome: Progressing  Flowsheets (Taken 9/21/2022 1634 by Alex Hagen RN)  Achieves optimal ventilation and oxygenation:   Assess for changes in respiratory status   Position to facilitate oxygenation and minimize respiratory effort   Assess for changes in mentation and behavior   Oxygen supplementation based on oxygen saturation or arterial blood gases   Assess the need for suctioning and aspirate as needed   Respiratory therapy support as indicated     Problem: Cardiovascular - Adult  Goal: Maintains optimal cardiac output and hemodynamic stability  Outcome: Progressing  Flowsheets (Taken 9/20/2022 1755 by Alex Hagen RN)  Maintains optimal cardiac output and hemodynamic stability:   Monitor blood pressure and heart rate   Monitor urine output and notify Licensed Independent Practitioner for values outside of normal range     Problem: ABCDS Injury Assessment  Goal: Absence of physical injury  Outcome: Progressing  Flowsheets (Taken 9/22/2022 0611)  Absence of Physical Injury: Implement safety measures based on patient assessment     Problem: Skin/Tissue Integrity  Goal: Absence of new skin breakdown  Description: 1. Monitor for areas of redness and/or skin breakdown  2. Assess vascular access sites hourly  3. Every 4-6 hours minimum:  Change oxygen saturation probe site  4. Every 4-6 hours:  If on nasal continuous positive airway pressure, respiratory therapy assess nares and determine need for appliance change or resting period.   Outcome: Progressing

## 2022-09-25 ENCOUNTER — FOLLOWUP TELEPHONE ENCOUNTER (OUTPATIENT)
Dept: INPATIENT UNIT | Age: 62
End: 2022-09-25

## 2022-09-25 NOTE — PROGRESS NOTES
Attempted to reach patient for 72 hour HF hospital follow up. Call was unanswered. Will try again later.

## 2022-09-26 NOTE — PROGRESS NOTES
Attempted to contact patient for 72 hour HF hospital follow up call. No answer. Will try a third time later this evening.

## 2022-09-29 ENCOUNTER — OFFICE VISIT (OUTPATIENT)
Dept: FAMILY MEDICINE CLINIC | Age: 62
End: 2022-09-29
Payer: MEDICARE

## 2022-09-29 VITALS
BODY MASS INDEX: 51.91 KG/M2 | WEIGHT: 293 LBS | OXYGEN SATURATION: 98 % | HEIGHT: 63 IN | DIASTOLIC BLOOD PRESSURE: 86 MMHG | TEMPERATURE: 97.9 F | SYSTOLIC BLOOD PRESSURE: 128 MMHG | HEART RATE: 66 BPM

## 2022-09-29 DIAGNOSIS — I50.31 ACUTE DIASTOLIC HEART FAILURE (HCC): Primary | ICD-10-CM

## 2022-09-29 DIAGNOSIS — I50.31 ACUTE DIASTOLIC HEART FAILURE (HCC): ICD-10-CM

## 2022-09-29 DIAGNOSIS — J96.22 ACUTE ON CHRONIC RESPIRATORY FAILURE WITH HYPERCAPNIA (HCC): ICD-10-CM

## 2022-09-29 LAB
A/G RATIO: 1.4 (ref 1.1–2.2)
ALBUMIN SERPL-MCNC: 4 G/DL (ref 3.4–5)
ALP BLD-CCNC: 100 U/L (ref 40–129)
ALT SERPL-CCNC: 10 U/L (ref 10–40)
ANION GAP SERPL CALCULATED.3IONS-SCNC: 12 MMOL/L (ref 3–16)
AST SERPL-CCNC: 10 U/L (ref 15–37)
BASOPHILS ABSOLUTE: 0.1 K/UL (ref 0–0.2)
BASOPHILS RELATIVE PERCENT: 1.9 %
BILIRUB SERPL-MCNC: 0.4 MG/DL (ref 0–1)
BUN BLDV-MCNC: 13 MG/DL (ref 7–20)
CALCIUM SERPL-MCNC: 9.6 MG/DL (ref 8.3–10.6)
CHLORIDE BLD-SCNC: 102 MMOL/L (ref 99–110)
CO2: 29 MMOL/L (ref 21–32)
CREAT SERPL-MCNC: 0.7 MG/DL (ref 0.6–1.2)
EOSINOPHILS ABSOLUTE: 0.1 K/UL (ref 0–0.6)
EOSINOPHILS RELATIVE PERCENT: 1.9 %
GFR AFRICAN AMERICAN: >60
GFR NON-AFRICAN AMERICAN: >60
GLUCOSE FASTING: 135 MG/DL (ref 70–99)
HCT VFR BLD CALC: 37 % (ref 36–48)
HEMOGLOBIN: 11.7 G/DL (ref 12–16)
LYMPHOCYTES ABSOLUTE: 1.1 K/UL (ref 1–5.1)
LYMPHOCYTES RELATIVE PERCENT: 23.1 %
MCH RBC QN AUTO: 25.5 PG (ref 26–34)
MCHC RBC AUTO-ENTMCNC: 31.6 G/DL (ref 31–36)
MCV RBC AUTO: 80.9 FL (ref 80–100)
MONOCYTES ABSOLUTE: 0.4 K/UL (ref 0–1.3)
MONOCYTES RELATIVE PERCENT: 7.2 %
NEUTROPHILS ABSOLUTE: 3.3 K/UL (ref 1.7–7.7)
NEUTROPHILS RELATIVE PERCENT: 65.9 %
PDW BLD-RTO: 17.6 % (ref 12.4–15.4)
PLATELET # BLD: 222 K/UL (ref 135–450)
PMV BLD AUTO: 8.9 FL (ref 5–10.5)
POTASSIUM SERPL-SCNC: 3.9 MMOL/L (ref 3.5–5.1)
PRO-BNP: 369 PG/ML (ref 0–124)
RBC # BLD: 4.57 M/UL (ref 4–5.2)
SODIUM BLD-SCNC: 143 MMOL/L (ref 136–145)
TOTAL PROTEIN: 6.8 G/DL (ref 6.4–8.2)
WBC # BLD: 4.9 K/UL (ref 4–11)

## 2022-09-29 PROCEDURE — G8427 DOCREV CUR MEDS BY ELIG CLIN: HCPCS | Performed by: NURSE PRACTITIONER

## 2022-09-29 PROCEDURE — 99214 OFFICE O/P EST MOD 30 MIN: CPT | Performed by: NURSE PRACTITIONER

## 2022-09-29 PROCEDURE — 1111F DSCHRG MED/CURRENT MED MERGE: CPT | Performed by: NURSE PRACTITIONER

## 2022-09-29 PROCEDURE — 3017F COLORECTAL CA SCREEN DOC REV: CPT | Performed by: NURSE PRACTITIONER

## 2022-09-29 PROCEDURE — 4004F PT TOBACCO SCREEN RCVD TLK: CPT | Performed by: NURSE PRACTITIONER

## 2022-09-29 PROCEDURE — G8417 CALC BMI ABV UP PARAM F/U: HCPCS | Performed by: NURSE PRACTITIONER

## 2022-09-29 ASSESSMENT — ENCOUNTER SYMPTOMS
SORE THROAT: 0
NAUSEA: 0
EYE REDNESS: 0
BACK PAIN: 0
CONSTIPATION: 0
ABDOMINAL PAIN: 0
CHEST TIGHTNESS: 0
BLOOD IN STOOL: 0
WHEEZING: 0
COLOR CHANGE: 0
EYE ITCHING: 0
VOMITING: 0
RHINORRHEA: 0
SINUS PRESSURE: 0
SHORTNESS OF BREATH: 0
DIARRHEA: 0
COUGH: 1

## 2022-09-29 ASSESSMENT — PATIENT HEALTH QUESTIONNAIRE - PHQ9
1. LITTLE INTEREST OR PLEASURE IN DOING THINGS: 0
SUM OF ALL RESPONSES TO PHQ QUESTIONS 1-9: 0
SUM OF ALL RESPONSES TO PHQ QUESTIONS 1-9: 0
2. FEELING DOWN, DEPRESSED OR HOPELESS: 0
SUM OF ALL RESPONSES TO PHQ QUESTIONS 1-9: 0
SUM OF ALL RESPONSES TO PHQ QUESTIONS 1-9: 0
SUM OF ALL RESPONSES TO PHQ9 QUESTIONS 1 & 2: 0

## 2022-09-29 NOTE — PROGRESS NOTES
Anastasiia Hunter (:  1960) is a 58 y.o. female,Established patient, here for evaluation of the following chief complaint(s): Other (ED follow up)      ASSESSMENT/PLAN:  1. Acute diastolic heart failure ([address]  Assessment & Plan:  Currently stable, improved. Continue medications as prescribed. Will recheck blood work today. Orders:  -     Brain Natriuretic Peptide; Future  -     Comprehensive Metabolic Panel, Fasting; Future  -     CBC with Auto Differential; Future  2. Acute on chronic respiratory failure with hypercapnia (HCC)  Assessment & Plan:  Stable, no wheezing on assessment. Continue home oxygen 2-3 L as needed. Monitor oxygen levels at home. Call office or go to ED if shortness of breath symptoms reoccur. No follow-ups on file. SUBJECTIVE/OBJECTIVE:  Patient presents for out of town hospital follow up. She was in town from [place] visiting family when she had a COPD and CHF exacerbation. She is now doing much better, denies wheezing, acute sob, chest pain. She is on bumex, received lasix in the hospital for weight gain and fluid retention. She feels the swelling is much improved, weight is decreased. Using O2 intermittently at home, 2-3 liters. She takes inhalers as prescribed. She plans to follow up with PCP in [place] when she flies back home. Current Outpatient Medications   Medication Sig Dispense Refill    bumetanide (BUMEX) 1 MG tablet Take 1 tablet by mouth 2 times daily 30 tablet 3    Dulaglutide (TRULICITY) 4.5 IN/3.1ID SOPN Inject 4.5 mg into the skin once a week Usually took on Friday, having trouble getting meds.  Lives in [place], visiting currently 1 Adjustable Dose Pre-filled Pen Syringe 0    pantoprazole (PROTONIX) 40 MG tablet Take 1 tablet by mouth every morning (before breakfast) 30 tablet 3    apixaban (ELIQUIS) 5 MG TABS tablet Take 5 mg by mouth 2 times daily      budesonide-formoterol (SYMBICORT) 160-4.5 MCG/ACT AERO Inhale 2 puffs into the lungs daily albuterol (PROVENTIL HFA;VENTOLIN HFA) 108 (90 BASE) MCG/ACT inhaler Inhale 2 puffs into the lungs every 6 hours as needed for Wheezing. 1 Inhaler 3    atorvastatin (LIPITOR) 20 MG tablet Take 20 mg by mouth daily (Patient not taking: Reported on 9/29/2022)      tiotropium (SPIRIVA) 18 MCG inhalation capsule Inhale 18 mcg into the lungs daily (Patient not taking: Reported on 9/29/2022)      nitroGLYCERIN (NITROSTAT) 0.4 MG SL tablet Place 0.4 mg under the tongue every 5 minutes as needed for Chest pain (Patient not taking: Reported on 9/29/2022)       No current facility-administered medications for this visit. Review of Systems   Constitutional:  Negative for chills, fatigue and fever. HENT:  Negative for congestion, ear pain, postnasal drip, rhinorrhea, sinus pressure, sneezing and sore throat. Eyes:  Negative for redness and itching. Respiratory:  Positive for cough. Negative for chest tightness, shortness of breath and wheezing. Cardiovascular:  Positive for leg swelling. Negative for chest pain and palpitations. Gastrointestinal:  Negative for abdominal pain, blood in stool, constipation, diarrhea, nausea and vomiting. Endocrine: Negative for cold intolerance and heat intolerance. Genitourinary:  Negative for difficulty urinating, dysuria, flank pain, frequency, hematuria and urgency. Musculoskeletal:  Negative for arthralgias, back pain, joint swelling and myalgias. Skin:  Negative for color change, pallor, rash and wound. Allergic/Immunologic: Negative for environmental allergies and food allergies. Neurological:  Negative for dizziness, seizures, syncope, weakness, light-headedness, numbness and headaches. Hematological:  Negative for adenopathy. Does not bruise/bleed easily. Psychiatric/Behavioral:  Negative for confusion, sleep disturbance and suicidal ideas. The patient is not nervous/anxious and is not hyperactive.     All other systems reviewed and are negative. Vitals:    09/29/22 1232   BP: 128/86   Site: Right Upper Arm   Position: Sitting   Cuff Size: Large Adult   Pulse: 66   Temp: 97.9 °F (36.6 °C)   SpO2: 98%   Weight: (!) 337 lb (152.9 kg)   Height: 5' 3\" (1.6 m)       Physical Exam  Constitutional:       Appearance: Normal appearance. She is well-developed. HENT:      Head: Normocephalic and atraumatic. Right Ear: Hearing normal.      Left Ear: Hearing normal.      Nose: No mucosal edema. Right Sinus: No maxillary sinus tenderness or frontal sinus tenderness. Left Sinus: No maxillary sinus tenderness or frontal sinus tenderness. Mouth/Throat: Tonsils: No tonsillar abscesses. Eyes:      Extraocular Movements: Extraocular movements intact. Pupils: Pupils are equal, round, and reactive to light. Cardiovascular:      Rate and Rhythm: Normal rate and regular rhythm. Pulses: Normal pulses. Heart sounds: Normal heart sounds. Pulmonary:      Effort: Pulmonary effort is normal.      Breath sounds: Normal breath sounds. Lymphadenopathy:      Head:      Right side of head: No submental, submandibular, tonsillar, preauricular, posterior auricular or occipital adenopathy. Left side of head: No submental, submandibular, tonsillar, preauricular, posterior auricular or occipital adenopathy. Skin:     General: Skin is warm and dry. Neurological:      Mental Status: She is alert and oriented to person, place, and time. Psychiatric:         Mood and Affect: Mood normal.         Behavior: Behavior normal.               An electronic signature was used to authenticate this note.     --Musa Hagan, YANA - CNP

## 2022-09-29 NOTE — ASSESSMENT & PLAN NOTE
Stable, no wheezing on assessment. Continue home oxygen 2-3 L as needed. Monitor oxygen levels at home. Call office or go to ED if shortness of breath symptoms reoccur.

## 2022-09-30 ENCOUNTER — TELEPHONE (OUTPATIENT)
Dept: FAMILY MEDICINE CLINIC | Age: 62
End: 2022-09-30

## 2022-09-30 NOTE — TELEPHONE ENCOUNTER
Called and spoke with pt and informed her that PCP is out of office until Monday and that labs have not been resulted on just yet. I also explained to pt that I didn't see any orders for a new oxygen tank to be sent in but that I would gain more information can call her back once we heard back. Pt verbalized understanding and has no other questions at this time.

## 2022-09-30 NOTE — TELEPHONE ENCOUNTER
Pt called and wants to know her lab results and also wants to know about getting oxygen tanks ordered. She stated that she talked to Katerine Nguyễn about this at her appoint.     LOV: 9/29/22

## 2022-10-03 NOTE — TELEPHONE ENCOUNTER
Spoke with [facility] medical services and they stated that the handheld oxygen concentrator is on a 6 week   Back order. She can be added to the list to get it. The fax number is [phone]. The main number is [phone]. It must have a pulse setting.

## 2022-10-03 NOTE — TELEPHONE ENCOUNTER
Please call patient back, tell her that [facility] does not have her listed as a patient, need company who handles her oxygen.  Thanks

## 2022-10-03 NOTE — TELEPHONE ENCOUNTER
Please call [employer] and ask them for a small handheld oxygen concentrator for her. I sent her labs to her [facility] last week---they were stable. And counts were improving.

## 2023-03-29 ENCOUNTER — OFFICE VISIT (OUTPATIENT)
Dept: URBAN - METROPOLITAN AREA CLINIC 30 | Facility: CLINIC | Age: 63
End: 2023-03-29
Payer: COMMERCIAL

## 2023-03-29 DIAGNOSIS — E11.9 TYPE 2 DIABETES MELLITUS WITHOUT COMPLICATIONS: Primary | ICD-10-CM

## 2023-03-29 DIAGNOSIS — H40.033 ANATOMICAL NARROW ANGLE, BILATERAL: ICD-10-CM

## 2023-03-29 DIAGNOSIS — H43.811 VITREOUS DEGENERATION, RIGHT EYE: ICD-10-CM

## 2023-03-29 DIAGNOSIS — Z79.4 LONG TERM (CURRENT) USE OF INSULIN: ICD-10-CM

## 2023-03-29 DIAGNOSIS — H25.813 COMBINED FORMS OF AGE-RELATED CATARACT, BILATERAL: ICD-10-CM

## 2023-03-29 DIAGNOSIS — H35.3131 BILATERAL NONEXUDATIVE AGE-RELATED MACULAR DEGENERATION, EARLY DRY STAGE: ICD-10-CM

## 2023-03-29 PROCEDURE — 92134 CPTRZ OPH DX IMG PST SGM RTA: CPT | Performed by: OPTOMETRIST

## 2023-03-29 PROCEDURE — 92014 COMPRE OPH EXAM EST PT 1/>: CPT | Performed by: OPTOMETRIST

## 2023-03-29 RX ORDER — VIT E/DHA/LUT/ZEAX/ASTAX/BILB 25-220-5
CAPSULE ORAL
Qty: 120 | Refills: 5 | Status: ACTIVE
Start: 2023-03-29

## 2023-03-29 ASSESSMENT — VISUAL ACUITY
OS: 20/25
OD: 20/25

## 2023-03-29 ASSESSMENT — INTRAOCULAR PRESSURE
OS: 16
OD: 16

## 2023-03-29 ASSESSMENT — KERATOMETRY
OS: 46.52
OD: 46.30

## 2023-03-29 NOTE — IMPRESSION/PLAN
Impression: Bilateral nonexudative age-related macular degeneration, early dry stage: H35.3131.
-Fhx Plan: Pt educ on findings. Rec AREDs 2 vitamins, sun protection, leafy greens, etc. +Smoker-trying to quit, smokes a pack every 3 days. No signs of exudation on exam or OCT. Pt instructed to contact us immediately if notices decline in vision. Rx sent for [facility].

## 2024-03-29 ENCOUNTER — OFFICE VISIT (OUTPATIENT)
Dept: URBAN - METROPOLITAN AREA CLINIC 30 | Facility: CLINIC | Age: 64
End: 2024-03-29
Payer: COMMERCIAL

## 2024-03-29 DIAGNOSIS — E11.9 TYPE 2 DIABETES MELLITUS WITHOUT COMPLICATIONS: ICD-10-CM

## 2024-03-29 DIAGNOSIS — H40.033 ANATOMICAL NARROW ANGLE, BILATERAL: ICD-10-CM

## 2024-03-29 DIAGNOSIS — Z79.4 LONG TERM (CURRENT) USE OF INSULIN: ICD-10-CM

## 2024-03-29 DIAGNOSIS — H35.3131 NONEXUDATIVE AGE-RELATED MACULAR DEGENERATION, BILATERAL, EARLY DRY STAGE: Primary | ICD-10-CM

## 2024-03-29 DIAGNOSIS — H43.811 VITREOUS DEGENERATION, RIGHT EYE: ICD-10-CM

## 2024-03-29 DIAGNOSIS — H25.813 COMBINED FORMS OF AGE-RELATED CATARACT, BILATERAL: ICD-10-CM

## 2024-03-29 PROCEDURE — 92014 COMPRE OPH EXAM EST PT 1/>: CPT | Performed by: OPTOMETRIST

## 2024-03-29 PROCEDURE — 92134 CPTRZ OPH DX IMG PST SGM RTA: CPT | Performed by: OPTOMETRIST

## 2024-03-29 RX ORDER — VIT E/DHA/LUT/ZEAX/ASTAX/BILB 25-220-5
CAPSULE ORAL
Qty: 120 | Refills: 5 | Status: ACTIVE
Start: 2024-03-29

## 2024-03-29 ASSESSMENT — KERATOMETRY
OS: 46.55
OD: 46.66

## 2024-03-29 ASSESSMENT — VISUAL ACUITY
OD: 20/20
OS: 20/20

## 2024-03-29 ASSESSMENT — INTRAOCULAR PRESSURE
OD: 16
OS: 16

## 2024-06-12 NOTE — IMPRESSION/PLAN
Impression: Type 2 diabetes mellitus without complications Plan: No retinopathy on exam today. Patient educated on importance of BG control. Monitor annually. [Fatigue] : no fatigue [Decreased Appetite] : appetite not decreased [Recent Weight Gain (___ Lbs)] : no recent weight gain [Recent Weight Loss (___ Lbs)] : no recent weight loss [Visual Field Defect] : no visual field defect [Dry Eyes] : no dryness [Dysphagia] : no dysphagia [Neck Pain] : no neck pain [Dysphonia] : no dysphonia [Nasal Congestion] : no nasal congestion [Chest Pain] : no chest pain [Palpitations] : no palpitations [Shortness Of Breath] : no shortness of breath [Nausea] : no nausea [Vomiting] : no vomiting [Polyuria] : no polyuria [Irregular Menses] : regular menses [Joint Pain] : no joint pain [Muscle Weakness] : no muscle weakness [Acanthosis] : no acanthosis  [Acne] : no acne [Headaches] : no headaches [Tremors] : no tremors [Depression] : no depression [Polydipsia] : no polydipsia [Cold Intolerance] : no cold intolerance [Easy Bleeding] : no ~M tendency for easy bleeding [Easy Bruising] : no tendency for easy bruising

## 2024-09-24 ENCOUNTER — HOSPITAL ENCOUNTER (INPATIENT)
Age: 64
LOS: 2 days | Discharge: HOME | DRG: 291 | End: 2024-09-26
Attending: EMERGENCY MEDICINE | Admitting: INTERNAL MEDICINE
Payer: MEDICARE

## 2024-09-24 ENCOUNTER — APPOINTMENT (OUTPATIENT)
Dept: CT IMAGING | Age: 64
DRG: 291 | End: 2024-09-24
Payer: MEDICARE

## 2024-09-24 ENCOUNTER — APPOINTMENT (OUTPATIENT)
Dept: GENERAL RADIOLOGY | Age: 64
DRG: 291 | End: 2024-09-24
Payer: MEDICARE

## 2024-09-24 DIAGNOSIS — I50.43 CHF (CONGESTIVE HEART FAILURE), NYHA CLASS I, ACUTE ON CHRONIC, COMBINED (HCC): ICD-10-CM

## 2024-09-24 DIAGNOSIS — R06.00 DYSPNEA, UNSPECIFIED TYPE: Primary | ICD-10-CM

## 2024-09-24 DIAGNOSIS — I89.0 LYMPHEDEMA: ICD-10-CM

## 2024-09-24 LAB
ANION GAP SERPL CALCULATED.3IONS-SCNC: 12 MMOL/L (ref 3–16)
BACTERIA URNS QL MICRO: ABNORMAL /HPF
BASE EXCESS BLDV CALC-SCNC: 5.6 MMOL/L (ref -2–3)
BASOPHILS # BLD: 0 K/UL (ref 0–0.2)
BASOPHILS NFR BLD: 1.1 %
BILIRUB UR QL STRIP.AUTO: NEGATIVE
BUN SERPL-MCNC: 13 MG/DL (ref 7–20)
CALCIUM SERPL-MCNC: 9.7 MG/DL (ref 8.3–10.6)
CHLORIDE SERPL-SCNC: 98 MMOL/L (ref 99–110)
CLARITY UR: CLEAR
CO2 BLDV-SCNC: 34 MMOL/L
CO2 SERPL-SCNC: 29 MMOL/L (ref 21–32)
COHGB MFR BLDV: 5.3 % (ref 0–1.5)
COLOR UR: YELLOW
CREAT SERPL-MCNC: 0.7 MG/DL (ref 0.6–1.2)
DEPRECATED RDW RBC AUTO: 16.5 % (ref 12.4–15.4)
DO-HGB MFR BLDV: 34.8 %
EOSINOPHIL # BLD: 0.1 K/UL (ref 0–0.6)
EOSINOPHIL NFR BLD: 1.3 %
EPI CELLS #/AREA URNS HPF: ABNORMAL /HPF (ref 0–5)
GFR SERPLBLD CREATININE-BSD FMLA CKD-EPI: >90 ML/MIN/{1.73_M2}
GLUCOSE SERPL-MCNC: 96 MG/DL (ref 70–99)
GLUCOSE UR STRIP.AUTO-MCNC: NEGATIVE MG/DL
HCO3 BLDV-SCNC: 32.4 MMOL/L (ref 24–28)
HCT VFR BLD AUTO: 43.9 % (ref 36–48)
HGB BLD-MCNC: 14.2 G/DL (ref 12–16)
HGB UR QL STRIP.AUTO: ABNORMAL
KETONES UR STRIP.AUTO-MCNC: NEGATIVE MG/DL
LEUKOCYTE ESTERASE UR QL STRIP.AUTO: ABNORMAL
LYMPHOCYTES # BLD: 1.2 K/UL (ref 1–5.1)
LYMPHOCYTES NFR BLD: 27.7 %
MCH RBC QN AUTO: 27.4 PG (ref 26–34)
MCHC RBC AUTO-ENTMCNC: 32.3 G/DL (ref 31–36)
MCV RBC AUTO: 84.9 FL (ref 80–100)
METHGB MFR BLDV: 0.2 % (ref 0–1.5)
MONOCYTES # BLD: 0.3 K/UL (ref 0–1.3)
MONOCYTES NFR BLD: 6.7 %
NEUTROPHILS # BLD: 2.8 K/UL (ref 1.7–7.7)
NEUTROPHILS NFR BLD: 63.2 %
NITRITE UR QL STRIP.AUTO: POSITIVE
NT-PROBNP SERPL-MCNC: 132 PG/ML (ref 0–124)
PCO2 BLDV: 54.5 MMHG (ref 41–51)
PH BLDV: 7.38 [PH] (ref 7.35–7.45)
PH UR STRIP.AUTO: 6.5 [PH] (ref 5–8)
PLATELET # BLD AUTO: 198 K/UL (ref 135–450)
PMV BLD AUTO: 8.8 FL (ref 5–10.5)
PO2 BLDV: 33.5 MMHG (ref 25–40)
POTASSIUM SERPL-SCNC: 3.8 MMOL/L (ref 3.5–5.1)
PROT UR STRIP.AUTO-MCNC: NEGATIVE MG/DL
RBC # BLD AUTO: 5.17 M/UL (ref 4–5.2)
RBC #/AREA URNS HPF: ABNORMAL /HPF (ref 0–4)
SAO2 % BLDV: 63 %
SODIUM SERPL-SCNC: 139 MMOL/L (ref 136–145)
SP GR UR STRIP.AUTO: 1.02 (ref 1–1.03)
TROPONIN, HIGH SENSITIVITY: 8 NG/L (ref 0–14)
TROPONIN, HIGH SENSITIVITY: 8 NG/L (ref 0–14)
UA COMPLETE W REFLEX CULTURE PNL UR: ABNORMAL
UA DIPSTICK W REFLEX MICRO PNL UR: YES
URN SPEC COLLECT METH UR: ABNORMAL
UROBILINOGEN UR STRIP-ACNC: 0.2 E.U./DL
WBC # BLD AUTO: 4.4 K/UL (ref 4–11)
WBC #/AREA URNS HPF: ABNORMAL /HPF (ref 0–5)

## 2024-09-24 PROCEDURE — 80048 BASIC METABOLIC PNL TOTAL CA: CPT

## 2024-09-24 PROCEDURE — 1200000000 HC SEMI PRIVATE

## 2024-09-24 PROCEDURE — 83880 ASSAY OF NATRIURETIC PEPTIDE: CPT

## 2024-09-24 PROCEDURE — 71046 X-RAY EXAM CHEST 2 VIEWS: CPT

## 2024-09-24 PROCEDURE — 6360000002 HC RX W HCPCS: Performed by: EMERGENCY MEDICINE

## 2024-09-24 PROCEDURE — 81001 URINALYSIS AUTO W/SCOPE: CPT

## 2024-09-24 PROCEDURE — 6370000000 HC RX 637 (ALT 250 FOR IP): Performed by: INTERNAL MEDICINE

## 2024-09-24 PROCEDURE — 93005 ELECTROCARDIOGRAM TRACING: CPT | Performed by: EMERGENCY MEDICINE

## 2024-09-24 PROCEDURE — 84484 ASSAY OF TROPONIN QUANT: CPT

## 2024-09-24 PROCEDURE — 96374 THER/PROPH/DIAG INJ IV PUSH: CPT

## 2024-09-24 PROCEDURE — 99285 EMERGENCY DEPT VISIT HI MDM: CPT

## 2024-09-24 PROCEDURE — 6360000004 HC RX CONTRAST MEDICATION: Performed by: EMERGENCY MEDICINE

## 2024-09-24 PROCEDURE — 82803 BLOOD GASES ANY COMBINATION: CPT

## 2024-09-24 PROCEDURE — 85025 COMPLETE CBC W/AUTO DIFF WBC: CPT

## 2024-09-24 PROCEDURE — 71260 CT THORAX DX C+: CPT

## 2024-09-24 RX ORDER — FUROSEMIDE 10 MG/ML
40 INJECTION INTRAMUSCULAR; INTRAVENOUS ONCE
Status: COMPLETED | OUTPATIENT
Start: 2024-09-24 | End: 2024-09-24

## 2024-09-24 RX ORDER — FUROSEMIDE 40 MG
40 TABLET ORAL 2 TIMES DAILY
Status: ON HOLD | COMMUNITY
Start: 2024-08-07 | End: 2024-09-26 | Stop reason: HOSPADM

## 2024-09-24 RX ORDER — SODIUM CHLORIDE 0.9 % (FLUSH) 0.9 %
5-40 SYRINGE (ML) INJECTION EVERY 12 HOURS SCHEDULED
Status: DISCONTINUED | OUTPATIENT
Start: 2024-09-24 | End: 2024-09-26 | Stop reason: HOSPADM

## 2024-09-24 RX ORDER — ONDANSETRON 4 MG/1
4 TABLET, ORALLY DISINTEGRATING ORAL EVERY 8 HOURS PRN
Status: DISCONTINUED | OUTPATIENT
Start: 2024-09-24 | End: 2024-09-26 | Stop reason: HOSPADM

## 2024-09-24 RX ORDER — POTASSIUM CHLORIDE 7.45 MG/ML
10 INJECTION INTRAVENOUS PRN
Status: DISCONTINUED | OUTPATIENT
Start: 2024-09-24 | End: 2024-09-26 | Stop reason: HOSPADM

## 2024-09-24 RX ORDER — ACETAMINOPHEN 325 MG/1
650 TABLET ORAL EVERY 6 HOURS PRN
Status: DISCONTINUED | OUTPATIENT
Start: 2024-09-24 | End: 2024-09-26 | Stop reason: HOSPADM

## 2024-09-24 RX ORDER — INSULIN LISPRO 100 [IU]/ML
0-4 INJECTION, SOLUTION INTRAVENOUS; SUBCUTANEOUS
Status: DISCONTINUED | OUTPATIENT
Start: 2024-09-25 | End: 2024-09-26 | Stop reason: HOSPADM

## 2024-09-24 RX ORDER — POTASSIUM CHLORIDE 1500 MG/1
40 TABLET, EXTENDED RELEASE ORAL PRN
Status: DISCONTINUED | OUTPATIENT
Start: 2024-09-24 | End: 2024-09-26 | Stop reason: HOSPADM

## 2024-09-24 RX ORDER — SODIUM CHLORIDE 0.9 % (FLUSH) 0.9 %
5-40 SYRINGE (ML) INJECTION PRN
Status: DISCONTINUED | OUTPATIENT
Start: 2024-09-24 | End: 2024-09-26 | Stop reason: HOSPADM

## 2024-09-24 RX ORDER — IPRATROPIUM BROMIDE AND ALBUTEROL SULFATE 2.5; .5 MG/3ML; MG/3ML
1 SOLUTION RESPIRATORY (INHALATION)
Status: DISCONTINUED | OUTPATIENT
Start: 2024-09-25 | End: 2024-09-25

## 2024-09-24 RX ORDER — PANTOPRAZOLE SODIUM 40 MG/1
40 TABLET, DELAYED RELEASE ORAL
Status: DISCONTINUED | OUTPATIENT
Start: 2024-09-25 | End: 2024-09-26 | Stop reason: HOSPADM

## 2024-09-24 RX ORDER — ACETAMINOPHEN 650 MG/1
650 SUPPOSITORY RECTAL EVERY 6 HOURS PRN
Status: DISCONTINUED | OUTPATIENT
Start: 2024-09-24 | End: 2024-09-26 | Stop reason: HOSPADM

## 2024-09-24 RX ORDER — MAGNESIUM SULFATE IN WATER 40 MG/ML
2000 INJECTION, SOLUTION INTRAVENOUS PRN
Status: DISCONTINUED | OUTPATIENT
Start: 2024-09-24 | End: 2024-09-26 | Stop reason: HOSPADM

## 2024-09-24 RX ORDER — IOPAMIDOL 755 MG/ML
75 INJECTION, SOLUTION INTRAVASCULAR
Status: COMPLETED | OUTPATIENT
Start: 2024-09-24 | End: 2024-09-24

## 2024-09-24 RX ORDER — SODIUM CHLORIDE 9 MG/ML
INJECTION, SOLUTION INTRAVENOUS PRN
Status: DISCONTINUED | OUTPATIENT
Start: 2024-09-24 | End: 2024-09-26 | Stop reason: HOSPADM

## 2024-09-24 RX ORDER — INSULIN LISPRO 100 [IU]/ML
0-4 INJECTION, SOLUTION INTRAVENOUS; SUBCUTANEOUS NIGHTLY
Status: DISCONTINUED | OUTPATIENT
Start: 2024-09-25 | End: 2024-09-26 | Stop reason: HOSPADM

## 2024-09-24 RX ORDER — ONDANSETRON 2 MG/ML
4 INJECTION INTRAMUSCULAR; INTRAVENOUS EVERY 6 HOURS PRN
Status: DISCONTINUED | OUTPATIENT
Start: 2024-09-24 | End: 2024-09-26 | Stop reason: HOSPADM

## 2024-09-24 RX ORDER — FUROSEMIDE 10 MG/ML
40 INJECTION INTRAMUSCULAR; INTRAVENOUS 2 TIMES DAILY
Status: DISCONTINUED | OUTPATIENT
Start: 2024-09-25 | End: 2024-09-26

## 2024-09-24 RX ORDER — POLYETHYLENE GLYCOL 3350 17 G/17G
17 POWDER, FOR SOLUTION ORAL DAILY PRN
Status: DISCONTINUED | OUTPATIENT
Start: 2024-09-24 | End: 2024-09-26 | Stop reason: HOSPADM

## 2024-09-24 RX ADMIN — IOPAMIDOL 75 ML: 755 INJECTION, SOLUTION INTRAVENOUS at 22:09

## 2024-09-24 RX ADMIN — FUROSEMIDE 40 MG: 10 INJECTION, SOLUTION INTRAMUSCULAR; INTRAVENOUS at 21:21

## 2024-09-24 RX ADMIN — APIXABAN 5 MG: 5 TABLET, FILM COATED ORAL at 23:55

## 2024-09-24 ASSESSMENT — ENCOUNTER SYMPTOMS
COUGH: 0
SHORTNESS OF BREATH: 1

## 2024-09-24 ASSESSMENT — PAIN - FUNCTIONAL ASSESSMENT: PAIN_FUNCTIONAL_ASSESSMENT: 0-10

## 2024-09-24 ASSESSMENT — PAIN SCALES - GENERAL: PAINLEVEL_OUTOF10: 0

## 2024-09-24 ASSESSMENT — LIFESTYLE VARIABLES
HOW OFTEN DO YOU HAVE A DRINK CONTAINING ALCOHOL: NEVER
HOW MANY STANDARD DRINKS CONTAINING ALCOHOL DO YOU HAVE ON A TYPICAL DAY: PATIENT DOES NOT DRINK

## 2024-09-25 ENCOUNTER — APPOINTMENT (OUTPATIENT)
Dept: VASCULAR LAB | Age: 64
DRG: 291 | End: 2024-09-25
Payer: MEDICARE

## 2024-09-25 LAB
ANION GAP SERPL CALCULATED.3IONS-SCNC: 11 MMOL/L (ref 3–16)
BUN SERPL-MCNC: 10 MG/DL (ref 7–20)
CALCIUM SERPL-MCNC: 8.9 MG/DL (ref 8.3–10.6)
CHLORIDE SERPL-SCNC: 100 MMOL/L (ref 99–110)
CO2 SERPL-SCNC: 30 MMOL/L (ref 21–32)
CREAT SERPL-MCNC: 0.7 MG/DL (ref 0.6–1.2)
EKG ATRIAL RATE: 98 BPM
EKG DIAGNOSIS: NORMAL
EKG P-R INTERVAL: 170 MS
EKG Q-T INTERVAL: 360 MS
EKG QRS DURATION: 78 MS
EKG QTC CALCULATION (BAZETT): 459 MS
EKG R AXIS: 99 DEGREES
EKG T AXIS: 7 DEGREES
EKG VENTRICULAR RATE: 98 BPM
GFR SERPLBLD CREATININE-BSD FMLA CKD-EPI: >90 ML/MIN/{1.73_M2}
GLUCOSE BLD-MCNC: 107 MG/DL (ref 70–99)
GLUCOSE BLD-MCNC: 122 MG/DL (ref 70–99)
GLUCOSE BLD-MCNC: 128 MG/DL (ref 70–99)
GLUCOSE BLD-MCNC: 141 MG/DL (ref 70–99)
GLUCOSE BLD-MCNC: 92 MG/DL (ref 70–99)
GLUCOSE SERPL-MCNC: 84 MG/DL (ref 70–99)
MAGNESIUM SERPL-MCNC: 1.6 MG/DL (ref 1.8–2.4)
PERFORMED ON: ABNORMAL
PERFORMED ON: NORMAL
POTASSIUM SERPL-SCNC: 3.4 MMOL/L (ref 3.5–5.1)
SODIUM SERPL-SCNC: 141 MMOL/L (ref 136–145)

## 2024-09-25 PROCEDURE — 93970 EXTREMITY STUDY: CPT

## 2024-09-25 PROCEDURE — 99223 1ST HOSP IP/OBS HIGH 75: CPT | Performed by: INTERNAL MEDICINE

## 2024-09-25 PROCEDURE — 6360000002 HC RX W HCPCS: Performed by: INTERNAL MEDICINE

## 2024-09-25 PROCEDURE — 1200000000 HC SEMI PRIVATE

## 2024-09-25 PROCEDURE — 94640 AIRWAY INHALATION TREATMENT: CPT

## 2024-09-25 PROCEDURE — 83735 ASSAY OF MAGNESIUM: CPT

## 2024-09-25 PROCEDURE — 36415 COLL VENOUS BLD VENIPUNCTURE: CPT

## 2024-09-25 PROCEDURE — 6370000000 HC RX 637 (ALT 250 FOR IP): Performed by: INTERNAL MEDICINE

## 2024-09-25 PROCEDURE — 2580000003 HC RX 258: Performed by: INTERNAL MEDICINE

## 2024-09-25 PROCEDURE — 80048 BASIC METABOLIC PNL TOTAL CA: CPT

## 2024-09-25 RX ORDER — IPRATROPIUM BROMIDE AND ALBUTEROL SULFATE 2.5; .5 MG/3ML; MG/3ML
1 SOLUTION RESPIRATORY (INHALATION) EVERY 4 HOURS PRN
Status: DISCONTINUED | OUTPATIENT
Start: 2024-09-25 | End: 2024-09-26 | Stop reason: HOSPADM

## 2024-09-25 RX ORDER — CHOLECALCIFEROL (VITAMIN D3) 1250 MCG
50000 CAPSULE ORAL WEEKLY
COMMUNITY

## 2024-09-25 RX ADMIN — ACETAMINOPHEN 650 MG: 325 TABLET ORAL at 20:39

## 2024-09-25 RX ADMIN — ARFORMOTEROL TARTRATE: 15 SOLUTION RESPIRATORY (INHALATION) at 20:59

## 2024-09-25 RX ADMIN — SODIUM CHLORIDE, PRESERVATIVE FREE 10 ML: 5 INJECTION INTRAVENOUS at 08:08

## 2024-09-25 RX ADMIN — SODIUM CHLORIDE, PRESERVATIVE FREE 10 ML: 5 INJECTION INTRAVENOUS at 20:44

## 2024-09-25 RX ADMIN — MAGNESIUM SULFATE HEPTAHYDRATE 2000 MG: 40 INJECTION, SOLUTION INTRAVENOUS at 08:25

## 2024-09-25 RX ADMIN — FUROSEMIDE 40 MG: 10 INJECTION, SOLUTION INTRAMUSCULAR; INTRAVENOUS at 08:04

## 2024-09-25 RX ADMIN — SODIUM CHLORIDE, PRESERVATIVE FREE 10 ML: 5 INJECTION INTRAVENOUS at 02:46

## 2024-09-25 RX ADMIN — PANTOPRAZOLE SODIUM 40 MG: 40 TABLET, DELAYED RELEASE ORAL at 06:55

## 2024-09-25 RX ADMIN — APIXABAN 5 MG: 5 TABLET, FILM COATED ORAL at 08:04

## 2024-09-25 RX ADMIN — POTASSIUM CHLORIDE 40 MEQ: 1500 TABLET, EXTENDED RELEASE ORAL at 06:54

## 2024-09-25 RX ADMIN — APIXABAN 5 MG: 5 TABLET, FILM COATED ORAL at 19:25

## 2024-09-25 RX ADMIN — FUROSEMIDE 40 MG: 10 INJECTION, SOLUTION INTRAMUSCULAR; INTRAVENOUS at 17:40

## 2024-09-25 RX ADMIN — ACETAMINOPHEN 650 MG: 325 TABLET ORAL at 09:55

## 2024-09-25 RX ADMIN — ACETAMINOPHEN 650 MG: 325 TABLET ORAL at 04:04

## 2024-09-25 ASSESSMENT — PAIN SCALES - GENERAL
PAINLEVEL_OUTOF10: 7
PAINLEVEL_OUTOF10: 3
PAINLEVEL_OUTOF10: 0
PAINLEVEL_OUTOF10: 8
PAINLEVEL_OUTOF10: 3
PAINLEVEL_OUTOF10: 3
PAINLEVEL_OUTOF10: 8

## 2024-09-25 ASSESSMENT — PAIN DESCRIPTION - ORIENTATION
ORIENTATION: RIGHT;LEFT
ORIENTATION: LEFT;RIGHT
ORIENTATION: RIGHT;LEFT

## 2024-09-25 ASSESSMENT — PAIN DESCRIPTION - LOCATION
LOCATION: LEG
LOCATION: HEAD
LOCATION: LEG
LOCATION: LEG

## 2024-09-25 ASSESSMENT — PAIN DESCRIPTION - DESCRIPTORS
DESCRIPTORS: SHARP
DESCRIPTORS: SHARP
DESCRIPTORS: ACHING
DESCRIPTORS: BURNING;ACHING

## 2024-09-25 ASSESSMENT — PAIN DESCRIPTION - ONSET: ONSET: ON-GOING

## 2024-09-25 ASSESSMENT — PAIN DESCRIPTION - PAIN TYPE: TYPE: ACUTE PAIN

## 2024-09-25 ASSESSMENT — PAIN DESCRIPTION - FREQUENCY: FREQUENCY: INTERMITTENT

## 2024-09-26 VITALS
SYSTOLIC BLOOD PRESSURE: 145 MMHG | WEIGHT: 293 LBS | OXYGEN SATURATION: 94 % | TEMPERATURE: 98.3 F | BODY MASS INDEX: 51.91 KG/M2 | HEART RATE: 59 BPM | RESPIRATION RATE: 18 BRPM | DIASTOLIC BLOOD PRESSURE: 66 MMHG | HEIGHT: 63 IN

## 2024-09-26 LAB
ANION GAP SERPL CALCULATED.3IONS-SCNC: 12 MMOL/L (ref 3–16)
BUN SERPL-MCNC: 15 MG/DL (ref 7–20)
CALCIUM SERPL-MCNC: 9.1 MG/DL (ref 8.3–10.6)
CHLORIDE SERPL-SCNC: 102 MMOL/L (ref 99–110)
CO2 SERPL-SCNC: 29 MMOL/L (ref 21–32)
CREAT SERPL-MCNC: 0.7 MG/DL (ref 0.6–1.2)
GFR SERPLBLD CREATININE-BSD FMLA CKD-EPI: >90 ML/MIN/{1.73_M2}
GLUCOSE BLD-MCNC: 101 MG/DL (ref 70–99)
GLUCOSE BLD-MCNC: 102 MG/DL (ref 70–99)
GLUCOSE SERPL-MCNC: 94 MG/DL (ref 70–99)
PERFORMED ON: ABNORMAL
PERFORMED ON: ABNORMAL
POTASSIUM SERPL-SCNC: 3.7 MMOL/L (ref 3.5–5.1)
SODIUM SERPL-SCNC: 143 MMOL/L (ref 136–145)

## 2024-09-26 PROCEDURE — 6370000000 HC RX 637 (ALT 250 FOR IP): Performed by: INTERNAL MEDICINE

## 2024-09-26 PROCEDURE — 2580000003 HC RX 258: Performed by: INTERNAL MEDICINE

## 2024-09-26 PROCEDURE — 94761 N-INVAS EAR/PLS OXIMETRY MLT: CPT

## 2024-09-26 PROCEDURE — 6360000002 HC RX W HCPCS: Performed by: INTERNAL MEDICINE

## 2024-09-26 PROCEDURE — 94640 AIRWAY INHALATION TREATMENT: CPT

## 2024-09-26 PROCEDURE — 99233 SBSQ HOSP IP/OBS HIGH 50: CPT | Performed by: INTERNAL MEDICINE

## 2024-09-26 PROCEDURE — 80048 BASIC METABOLIC PNL TOTAL CA: CPT

## 2024-09-26 PROCEDURE — 36415 COLL VENOUS BLD VENIPUNCTURE: CPT

## 2024-09-26 RX ORDER — ACETAMINOPHEN 500 MG
1000 TABLET ORAL EVERY 6 HOURS PRN
Qty: 90 TABLET | Refills: 0 | Status: SHIPPED | OUTPATIENT
Start: 2024-09-26

## 2024-09-26 RX ORDER — OXYCODONE HYDROCHLORIDE 5 MG/1
5 TABLET ORAL EVERY 4 HOURS PRN
Qty: 30 TABLET | Refills: 0 | Status: SHIPPED | OUTPATIENT
Start: 2024-09-26 | End: 2024-10-01

## 2024-09-26 RX ORDER — FUROSEMIDE 20 MG
60 TABLET ORAL DAILY
Qty: 60 TABLET | Refills: 0 | Status: SHIPPED | OUTPATIENT
Start: 2024-09-26

## 2024-09-26 RX ADMIN — ARFORMOTEROL TARTRATE: 15 SOLUTION RESPIRATORY (INHALATION) at 08:14

## 2024-09-26 RX ADMIN — ACETAMINOPHEN 650 MG: 325 TABLET ORAL at 05:07

## 2024-09-26 RX ADMIN — SODIUM CHLORIDE, PRESERVATIVE FREE 10 ML: 5 INJECTION INTRAVENOUS at 09:06

## 2024-09-26 RX ADMIN — APIXABAN 5 MG: 5 TABLET, FILM COATED ORAL at 09:06

## 2024-09-26 RX ADMIN — PANTOPRAZOLE SODIUM 40 MG: 40 TABLET, DELAYED RELEASE ORAL at 05:07

## 2024-09-26 ASSESSMENT — PAIN SCALES - GENERAL: PAINLEVEL_OUTOF10: 9

## 2024-09-30 ENCOUNTER — TELEPHONE (OUTPATIENT)
Age: 64
End: 2024-09-30

## 2024-09-30 NOTE — TELEPHONE ENCOUNTER
Care Transitions Initial Follow Up Call    Outreach made within 2 business days of discharge: Yes    Patient: Molly Zambrano Patient : 1960   MRN: [id number]  Reason for Admission:   Discharge Date: 24       Spoke with: Left message on recorder for patient to call back at  [phone]      Discharge department/facility:     [facility] Interactive Patient Contact:  Was patient able to fill all prescriptions:   Was patient instructed to bring all medications to the follow-up visit:   Is patient taking all medications as directed in the discharge summary?   Does patient understand their discharge instructions:   Does patient have questions or concerns that need addressed prior to 7-14 day follow up office visit:     Additional needs identified to be addressed with provider               Scheduled appointment with PCP within 7-14 days    Follow Up  Future Appointments   Date Time Provider Department Center   10/3/2024  1:00 PM Deb Friedman APRN - [facility]

## 2024-10-03 ENCOUNTER — OFFICE VISIT (OUTPATIENT)
Dept: CARDIOLOGY CLINIC | Age: 64
End: 2024-10-03
Payer: MEDICARE

## 2024-10-03 VITALS
BODY MASS INDEX: 58.49 KG/M2 | OXYGEN SATURATION: 95 % | DIASTOLIC BLOOD PRESSURE: 84 MMHG | HEART RATE: 89 BPM | WEIGHT: 293 LBS | SYSTOLIC BLOOD PRESSURE: 142 MMHG

## 2024-10-03 DIAGNOSIS — E78.2 MIXED HYPERLIPIDEMIA: ICD-10-CM

## 2024-10-03 DIAGNOSIS — R82.90 FOUL SMELLING URINE: ICD-10-CM

## 2024-10-03 DIAGNOSIS — I10 PRIMARY HYPERTENSION: ICD-10-CM

## 2024-10-03 DIAGNOSIS — Z86.711 HISTORY OF PULMONARY EMBOLISM: ICD-10-CM

## 2024-10-03 DIAGNOSIS — I50.31 ACUTE HEART FAILURE WITH PRESERVED EJECTION FRACTION (HFPEF) (HCC): Primary | ICD-10-CM

## 2024-10-03 PROCEDURE — G8484 FLU IMMUNIZE NO ADMIN: HCPCS | Performed by: NURSE PRACTITIONER

## 2024-10-03 PROCEDURE — 3077F SYST BP >= 140 MM HG: CPT | Performed by: NURSE PRACTITIONER

## 2024-10-03 PROCEDURE — 1036F TOBACCO NON-USER: CPT | Performed by: NURSE PRACTITIONER

## 2024-10-03 PROCEDURE — 99215 OFFICE O/P EST HI 40 MIN: CPT | Performed by: NURSE PRACTITIONER

## 2024-10-03 PROCEDURE — 3079F DIAST BP 80-89 MM HG: CPT | Performed by: NURSE PRACTITIONER

## 2024-10-03 PROCEDURE — G8417 CALC BMI ABV UP PARAM F/U: HCPCS | Performed by: NURSE PRACTITIONER

## 2024-10-03 PROCEDURE — 3017F COLORECTAL CA SCREEN DOC REV: CPT | Performed by: NURSE PRACTITIONER

## 2024-10-03 PROCEDURE — 1111F DSCHRG MED/CURRENT MED MERGE: CPT | Performed by: NURSE PRACTITIONER

## 2024-10-03 PROCEDURE — G8427 DOCREV CUR MEDS BY ELIG CLIN: HCPCS | Performed by: NURSE PRACTITIONER

## 2024-10-03 RX ORDER — FUROSEMIDE 20 MG
40 TABLET ORAL 2 TIMES DAILY
Qty: 360 TABLET | Refills: 3 | Status: SHIPPED | OUTPATIENT
Start: 2024-10-03

## 2024-10-03 RX ORDER — ATORVASTATIN CALCIUM 20 MG/1
20 TABLET, FILM COATED ORAL DAILY
Qty: 90 TABLET | Refills: 1 | Status: SHIPPED | OUTPATIENT
Start: 2024-10-03

## 2024-10-03 NOTE — PROGRESS NOTES
Please call patient and let her know that cardiology informed us that she has a urinary tract infection and needs a follow-up with her primary care.  I have not seen her in over 2 years we will need to come in the office for evaluation and treatment can she be seen tomorrow with Trupti?  
non-distended  Skin: Warm, dry. No rashes  Neuro/Psych: Alert and oriented x 3. Appropriate behavior  Ext:  No c/c. ++ edema/lymphedema     Weight  Wt Readings from Last 3 Encounters:   10/03/24 (!) 149.8 kg (330 lb 3.2 oz)   24 (!) 149.9 kg (330 lb 7.5 oz)   22 (!) 152.9 kg (337 lb)        CBC:   Lab Results   Component Value Date    WBC 4.4 2024    HGB 14.2 2024    HCT 43.9 2024    MCV 84.9 2024     2024     BMP:  Lab Results   Component Value Date    CREATININE 0.7 2024    BUN 15 2024     2024    K 3.7 2024     2024    CO2 29 2024     Estimated Creatinine Clearance: 117 mL/min (based on SCr of 0.7 mg/dL).  Mag:   Lab Results   Component Value Date/Time    MG 1.60 2024 03:43 AM     LIVER PROFILE:   Lab Results   Component Value Date    ALT 10 2022    AST 10 (L) 2022    ALKPHOS 100 2022    BILITOT 0.4 2022     LIPIDS:  No results found for: \"CHOL\", \"TRIG\", \"HDL\", \"LDL\", \"VLDL\", \"CHOLHDLRATIO\"  Pro-BNP   Lab Results   Component Value Date/Time    PROBNP 132 2024 08:37 PM    PROBNP 369 2022 01:21 PM    PROBNP 727 2022 04:55 PM    PROBNP 1,328 2022 12:26 PM       IMAGIN Echo   Left ventricular cavity size is normal with normal left ventricular wall thickness.   Ejection fraction is estimated to be 55-60%.   No regional wall motion abnormalities   Diastolic filling parameters suggest grade I diastolic dysfunction.   Mild calcification of the mitral valve with trivial mitral regurgitation and  mild mitral stenosis   The peak mitral valve velocity is 180m/s and the mean pressure gradient is   5mmHg.   The right ventricle is mildly enlarged. TAPSE = 1.36 cm , RV S'' Velocity   =8.59cm/s    Medications:   Current Outpatient Medications   Medication Sig Dispense Refill    furosemide (LASIX) 20 MG tablet Take 3 tablets by mouth daily 60 tablet 0    acetaminophen